# Patient Record
Sex: FEMALE | Race: WHITE | NOT HISPANIC OR LATINO | Employment: FULL TIME | URBAN - METROPOLITAN AREA
[De-identification: names, ages, dates, MRNs, and addresses within clinical notes are randomized per-mention and may not be internally consistent; named-entity substitution may affect disease eponyms.]

---

## 2017-03-21 ENCOUNTER — GENERIC CONVERSION - ENCOUNTER (OUTPATIENT)
Dept: OTHER | Facility: OTHER | Age: 42
End: 2017-03-21

## 2017-04-07 ENCOUNTER — ALLSCRIPTS OFFICE VISIT (OUTPATIENT)
Dept: OTHER | Facility: OTHER | Age: 42
End: 2017-04-07

## 2017-04-07 ENCOUNTER — LAB REQUISITION (OUTPATIENT)
Dept: LAB | Facility: HOSPITAL | Age: 42
End: 2017-04-07
Payer: COMMERCIAL

## 2017-04-07 DIAGNOSIS — I10 ESSENTIAL (PRIMARY) HYPERTENSION: ICD-10-CM

## 2017-04-07 DIAGNOSIS — N30.90 CYSTITIS WITHOUT HEMATURIA: ICD-10-CM

## 2017-04-07 LAB
BACTERIA UR QL AUTO: ABNORMAL /HPF
BILIRUB UR QL STRIP: NEGATIVE
BILIRUB UR QL STRIP: NORMAL
CLARITY UR: ABNORMAL
CLARITY UR: NORMAL
COLOR UR: YELLOW
COLOR UR: YELLOW
GLUCOSE (HISTORICAL): NORMAL
GLUCOSE UR STRIP-MCNC: NEGATIVE MG/DL
HGB UR QL STRIP.AUTO: ABNORMAL
HGB UR QL STRIP.AUTO: NORMAL
HYALINE CASTS #/AREA URNS LPF: ABNORMAL /LPF
KETONES UR STRIP-MCNC: NEGATIVE MG/DL
KETONES UR STRIP-MCNC: NORMAL MG/DL
LEUKOCYTE ESTERASE UR QL STRIP: ABNORMAL
LEUKOCYTE ESTERASE UR QL STRIP: NORMAL
NITRITE UR QL STRIP: NEGATIVE
NITRITE UR QL STRIP: NORMAL
NON-SQ EPI CELLS URNS QL MICRO: ABNORMAL /HPF
PH UR STRIP.AUTO: 7.5 [PH]
PH UR STRIP.AUTO: 8 [PH] (ref 4.5–8)
PROT UR STRIP-MCNC: ABNORMAL MG/DL
PROT UR STRIP-MCNC: NORMAL MG/DL
RBC #/AREA URNS AUTO: ABNORMAL /HPF
SP GR UR STRIP.AUTO: 1.01
SP GR UR STRIP.AUTO: 1.02 (ref 1–1.03)
UROBILINOGEN UR QL STRIP.AUTO: 0.2
UROBILINOGEN UR QL STRIP.AUTO: 1 E.U./DL
WBC #/AREA URNS AUTO: ABNORMAL /HPF

## 2017-04-07 PROCEDURE — 81001 URINALYSIS AUTO W/SCOPE: CPT | Performed by: INTERNAL MEDICINE

## 2017-04-07 PROCEDURE — 87086 URINE CULTURE/COLONY COUNT: CPT | Performed by: INTERNAL MEDICINE

## 2017-04-08 LAB — BACTERIA UR CULT: NORMAL

## 2017-04-10 ENCOUNTER — GENERIC CONVERSION - ENCOUNTER (OUTPATIENT)
Dept: OTHER | Facility: OTHER | Age: 42
End: 2017-04-10

## 2017-04-18 ENCOUNTER — ALLSCRIPTS OFFICE VISIT (OUTPATIENT)
Dept: OTHER | Facility: OTHER | Age: 42
End: 2017-04-18

## 2017-04-20 ENCOUNTER — ALLSCRIPTS OFFICE VISIT (OUTPATIENT)
Dept: OTHER | Facility: OTHER | Age: 42
End: 2017-04-20

## 2017-04-28 ENCOUNTER — ALLSCRIPTS OFFICE VISIT (OUTPATIENT)
Dept: OTHER | Facility: OTHER | Age: 42
End: 2017-04-28

## 2017-05-19 ENCOUNTER — ALLSCRIPTS OFFICE VISIT (OUTPATIENT)
Dept: OTHER | Facility: OTHER | Age: 42
End: 2017-05-19

## 2017-06-23 PROCEDURE — 88305 TISSUE EXAM BY PATHOLOGIST: CPT | Performed by: OBSTETRICS & GYNECOLOGY

## 2017-06-26 ENCOUNTER — LAB REQUISITION (OUTPATIENT)
Dept: LAB | Facility: HOSPITAL | Age: 42
End: 2017-06-26
Payer: COMMERCIAL

## 2017-06-26 DIAGNOSIS — N92.6 IRREGULAR MENSTRUATION: ICD-10-CM

## 2017-06-27 ENCOUNTER — APPOINTMENT (OUTPATIENT)
Dept: LAB | Facility: CLINIC | Age: 42
End: 2017-06-27
Payer: COMMERCIAL

## 2017-06-27 ENCOUNTER — TRANSCRIBE ORDERS (OUTPATIENT)
Dept: LAB | Facility: CLINIC | Age: 42
End: 2017-06-27

## 2017-06-27 DIAGNOSIS — Z01.818 PREOP EXAMINATION: ICD-10-CM

## 2017-06-27 DIAGNOSIS — N92.0 EXCESSIVE OR FREQUENT MENSTRUATION: Primary | ICD-10-CM

## 2017-06-27 DIAGNOSIS — N92.0 EXCESSIVE OR FREQUENT MENSTRUATION: ICD-10-CM

## 2017-06-27 LAB
ABO GROUP BLD: NORMAL
ANION GAP SERPL CALCULATED.3IONS-SCNC: 9 MMOL/L (ref 4–13)
B-HCG SERPL-ACNC: <2 MIU/ML
BASOPHILS # BLD AUTO: 0.03 THOUSANDS/ΜL (ref 0–0.1)
BASOPHILS NFR BLD AUTO: 0 % (ref 0–1)
BLD GP AB SCN SERPL QL: NEGATIVE
BUN SERPL-MCNC: 8 MG/DL (ref 5–25)
CALCIUM SERPL-MCNC: 8.8 MG/DL (ref 8.3–10.1)
CHLORIDE SERPL-SCNC: 104 MMOL/L (ref 100–108)
CO2 SERPL-SCNC: 26 MMOL/L (ref 21–32)
CREAT SERPL-MCNC: 0.94 MG/DL (ref 0.6–1.3)
EOSINOPHIL # BLD AUTO: 0.07 THOUSAND/ΜL (ref 0–0.61)
EOSINOPHIL NFR BLD AUTO: 1 % (ref 0–6)
ERYTHROCYTE [DISTWIDTH] IN BLOOD BY AUTOMATED COUNT: 13.1 % (ref 11.6–15.1)
GFR SERPL CREATININE-BSD FRML MDRD: >60 ML/MIN/1.73SQ M
GLUCOSE P FAST SERPL-MCNC: 88 MG/DL (ref 65–99)
HCT VFR BLD AUTO: 37.5 % (ref 34.8–46.1)
HGB BLD-MCNC: 11.8 G/DL (ref 11.5–15.4)
LYMPHOCYTES # BLD AUTO: 2.5 THOUSANDS/ΜL (ref 0.6–4.47)
LYMPHOCYTES NFR BLD AUTO: 34 % (ref 14–44)
MCH RBC QN AUTO: 27.1 PG (ref 26.8–34.3)
MCHC RBC AUTO-ENTMCNC: 31.5 G/DL (ref 31.4–37.4)
MCV RBC AUTO: 86 FL (ref 82–98)
MONOCYTES # BLD AUTO: 0.37 THOUSAND/ΜL (ref 0.17–1.22)
MONOCYTES NFR BLD AUTO: 5 % (ref 4–12)
NEUTROPHILS # BLD AUTO: 4.38 THOUSANDS/ΜL (ref 1.85–7.62)
NEUTS SEG NFR BLD AUTO: 60 % (ref 43–75)
PLATELET # BLD AUTO: 396 THOUSANDS/UL (ref 149–390)
PMV BLD AUTO: 9.9 FL (ref 8.9–12.7)
POTASSIUM SERPL-SCNC: 4.1 MMOL/L (ref 3.5–5.3)
RBC # BLD AUTO: 4.36 MILLION/UL (ref 3.81–5.12)
RH BLD: POSITIVE
SODIUM SERPL-SCNC: 139 MMOL/L (ref 136–145)
SPECIMEN EXPIRATION DATE: NORMAL
WBC # BLD AUTO: 7.35 THOUSAND/UL (ref 4.31–10.16)

## 2017-06-27 PROCEDURE — 86900 BLOOD TYPING SEROLOGIC ABO: CPT

## 2017-06-27 PROCEDURE — 85025 COMPLETE CBC W/AUTO DIFF WBC: CPT

## 2017-06-27 PROCEDURE — 86850 RBC ANTIBODY SCREEN: CPT

## 2017-06-27 PROCEDURE — 84702 CHORIONIC GONADOTROPIN TEST: CPT

## 2017-06-27 PROCEDURE — 86901 BLOOD TYPING SEROLOGIC RH(D): CPT

## 2017-06-27 PROCEDURE — 36415 COLL VENOUS BLD VENIPUNCTURE: CPT

## 2017-06-27 PROCEDURE — 80048 BASIC METABOLIC PNL TOTAL CA: CPT

## 2017-06-27 RX ORDER — MULTIVITAMIN
1 CAPSULE ORAL DAILY
COMMUNITY

## 2017-06-27 RX ORDER — AMLODIPINE BESYLATE 5 MG/1
5 TABLET ORAL DAILY
COMMUNITY
End: 2018-01-31 | Stop reason: SDUPTHER

## 2017-06-27 RX ORDER — LORATADINE 10 MG/1
10 TABLET ORAL DAILY
COMMUNITY

## 2017-06-30 ENCOUNTER — HOSPITAL ENCOUNTER (OUTPATIENT)
Facility: HOSPITAL | Age: 42
Setting detail: OUTPATIENT SURGERY
Discharge: HOME | End: 2017-07-01
Attending: OBSTETRICS & GYNECOLOGY | Admitting: OBSTETRICS & GYNECOLOGY
Payer: COMMERCIAL

## 2017-06-30 ENCOUNTER — ANESTHESIA (OUTPATIENT)
Dept: PERIOP | Facility: HOSPITAL | Age: 42
End: 2017-06-30
Payer: COMMERCIAL

## 2017-06-30 ENCOUNTER — ANESTHESIA EVENT (OUTPATIENT)
Dept: PERIOP | Facility: HOSPITAL | Age: 42
End: 2017-06-30
Payer: COMMERCIAL

## 2017-06-30 DIAGNOSIS — N92.1 EXCESSIVE AND FREQUENT MENSTRUATION WITH IRREGULAR CYCLE: ICD-10-CM

## 2017-06-30 PROBLEM — Z90.710 S/P LAPAROSCOPIC ASSISTED VAGINAL HYSTERECTOMY (LAVH): Status: ACTIVE | Noted: 2017-06-30

## 2017-06-30 LAB
EXT PREGNANCY TEST URINE: NEGATIVE
GLUCOSE SERPL-MCNC: 130 MG/DL (ref 65–140)
HCT VFR BLD AUTO: 28.3 % (ref 34.8–46.1)
HGB BLD-MCNC: 9.1 G/DL (ref 11.5–15.4)

## 2017-06-30 PROCEDURE — 94760 N-INVAS EAR/PLS OXIMETRY 1: CPT

## 2017-06-30 PROCEDURE — 82948 REAGENT STRIP/BLOOD GLUCOSE: CPT

## 2017-06-30 PROCEDURE — 85014 HEMATOCRIT: CPT | Performed by: OBSTETRICS & GYNECOLOGY

## 2017-06-30 PROCEDURE — 81025 URINE PREGNANCY TEST: CPT | Performed by: OBSTETRICS & GYNECOLOGY

## 2017-06-30 PROCEDURE — 88307 TISSUE EXAM BY PATHOLOGIST: CPT | Performed by: OBSTETRICS & GYNECOLOGY

## 2017-06-30 PROCEDURE — 85018 HEMOGLOBIN: CPT | Performed by: OBSTETRICS & GYNECOLOGY

## 2017-06-30 RX ORDER — MONTELUKAST SODIUM 10 MG/1
10 TABLET ORAL
Status: DISCONTINUED | OUTPATIENT
Start: 2017-06-30 | End: 2017-07-01 | Stop reason: HOSPADM

## 2017-06-30 RX ORDER — CEFAZOLIN SODIUM 1 G/3ML
INJECTION, POWDER, FOR SOLUTION INTRAMUSCULAR; INTRAVENOUS AS NEEDED
Status: DISCONTINUED | OUTPATIENT
Start: 2017-06-30 | End: 2017-06-30 | Stop reason: SURG

## 2017-06-30 RX ORDER — ONDANSETRON 2 MG/ML
4 INJECTION INTRAMUSCULAR; INTRAVENOUS ONCE AS NEEDED
Status: DISCONTINUED | OUTPATIENT
Start: 2017-06-30 | End: 2017-06-30 | Stop reason: HOSPADM

## 2017-06-30 RX ORDER — DOCUSATE SODIUM 100 MG/1
100 CAPSULE, LIQUID FILLED ORAL 2 TIMES DAILY
Status: DISCONTINUED | OUTPATIENT
Start: 2017-06-30 | End: 2017-07-01 | Stop reason: HOSPADM

## 2017-06-30 RX ORDER — ROCURONIUM BROMIDE 10 MG/ML
INJECTION, SOLUTION INTRAVENOUS AS NEEDED
Status: DISCONTINUED | OUTPATIENT
Start: 2017-06-30 | End: 2017-06-30 | Stop reason: SURG

## 2017-06-30 RX ORDER — ALBUTEROL SULFATE 90 UG/1
2 AEROSOL, METERED RESPIRATORY (INHALATION) EVERY 6 HOURS PRN
Status: DISCONTINUED | OUTPATIENT
Start: 2017-06-30 | End: 2017-07-01 | Stop reason: HOSPADM

## 2017-06-30 RX ORDER — FENTANYL CITRATE 50 UG/ML
INJECTION, SOLUTION INTRAMUSCULAR; INTRAVENOUS AS NEEDED
Status: DISCONTINUED | OUTPATIENT
Start: 2017-06-30 | End: 2017-06-30 | Stop reason: SURG

## 2017-06-30 RX ORDER — ALBUTEROL SULFATE 2.5 MG/3ML
2.5 SOLUTION RESPIRATORY (INHALATION) ONCE AS NEEDED
Status: DISCONTINUED | OUTPATIENT
Start: 2017-06-30 | End: 2017-06-30 | Stop reason: HOSPADM

## 2017-06-30 RX ORDER — FENTANYL CITRATE/PF 50 MCG/ML
25 SYRINGE (ML) INJECTION
Status: DISCONTINUED | OUTPATIENT
Start: 2017-06-30 | End: 2017-06-30 | Stop reason: HOSPADM

## 2017-06-30 RX ORDER — ONDANSETRON 2 MG/ML
4 INJECTION INTRAMUSCULAR; INTRAVENOUS EVERY 6 HOURS PRN
Status: DISCONTINUED | OUTPATIENT
Start: 2017-06-30 | End: 2017-07-01 | Stop reason: HOSPADM

## 2017-06-30 RX ORDER — FAMOTIDINE 20 MG/1
20 TABLET, FILM COATED ORAL 2 TIMES DAILY
Status: DISCONTINUED | OUTPATIENT
Start: 2017-06-30 | End: 2017-07-01 | Stop reason: HOSPADM

## 2017-06-30 RX ORDER — DIPHENHYDRAMINE HYDROCHLORIDE 50 MG/ML
25 INJECTION INTRAMUSCULAR; INTRAVENOUS EVERY 6 HOURS PRN
Status: DISCONTINUED | OUTPATIENT
Start: 2017-06-30 | End: 2017-07-01

## 2017-06-30 RX ORDER — LIDOCAINE HYDROCHLORIDE 10 MG/ML
INJECTION, SOLUTION INFILTRATION; PERINEURAL AS NEEDED
Status: DISCONTINUED | OUTPATIENT
Start: 2017-06-30 | End: 2017-06-30 | Stop reason: SURG

## 2017-06-30 RX ORDER — SODIUM CHLORIDE, SODIUM LACTATE, POTASSIUM CHLORIDE, CALCIUM CHLORIDE 600; 310; 30; 20 MG/100ML; MG/100ML; MG/100ML; MG/100ML
125 INJECTION, SOLUTION INTRAVENOUS CONTINUOUS
Status: DISCONTINUED | OUTPATIENT
Start: 2017-06-30 | End: 2017-06-30

## 2017-06-30 RX ORDER — AMLODIPINE BESYLATE 5 MG/1
5 TABLET ORAL DAILY
Status: DISCONTINUED | OUTPATIENT
Start: 2017-07-01 | End: 2017-07-01 | Stop reason: HOSPADM

## 2017-06-30 RX ORDER — SODIUM CHLORIDE 9 MG/ML
INJECTION, SOLUTION INTRAVENOUS CONTINUOUS PRN
Status: DISCONTINUED | OUTPATIENT
Start: 2017-06-30 | End: 2017-06-30 | Stop reason: SURG

## 2017-06-30 RX ORDER — SIMETHICONE 80 MG
80 TABLET,CHEWABLE ORAL EVERY 6 HOURS PRN
Status: DISCONTINUED | OUTPATIENT
Start: 2017-06-30 | End: 2017-07-01 | Stop reason: HOSPADM

## 2017-06-30 RX ORDER — PROPOFOL 10 MG/ML
INJECTION, EMULSION INTRAVENOUS AS NEEDED
Status: DISCONTINUED | OUTPATIENT
Start: 2017-06-30 | End: 2017-06-30 | Stop reason: SURG

## 2017-06-30 RX ORDER — LORATADINE 10 MG/1
10 TABLET ORAL DAILY
Status: DISCONTINUED | OUTPATIENT
Start: 2017-06-30 | End: 2017-07-01 | Stop reason: HOSPADM

## 2017-06-30 RX ORDER — METOCLOPRAMIDE HYDROCHLORIDE 5 MG/ML
5 INJECTION INTRAMUSCULAR; INTRAVENOUS ONCE AS NEEDED
Status: DISCONTINUED | OUTPATIENT
Start: 2017-06-30 | End: 2017-06-30 | Stop reason: HOSPADM

## 2017-06-30 RX ORDER — SODIUM CHLORIDE, SODIUM LACTATE, POTASSIUM CHLORIDE, CALCIUM CHLORIDE 600; 310; 30; 20 MG/100ML; MG/100ML; MG/100ML; MG/100ML
INJECTION, SOLUTION INTRAVENOUS CONTINUOUS PRN
Status: DISCONTINUED | OUTPATIENT
Start: 2017-06-30 | End: 2017-06-30 | Stop reason: SURG

## 2017-06-30 RX ORDER — MIDAZOLAM HYDROCHLORIDE 1 MG/ML
INJECTION INTRAMUSCULAR; INTRAVENOUS AS NEEDED
Status: DISCONTINUED | OUTPATIENT
Start: 2017-06-30 | End: 2017-06-30 | Stop reason: SURG

## 2017-06-30 RX ORDER — KETOROLAC TROMETHAMINE 30 MG/ML
INJECTION, SOLUTION INTRAMUSCULAR; INTRAVENOUS AS NEEDED
Status: DISCONTINUED | OUTPATIENT
Start: 2017-06-30 | End: 2017-06-30 | Stop reason: SURG

## 2017-06-30 RX ORDER — GLYCOPYRROLATE 0.2 MG/ML
INJECTION INTRAMUSCULAR; INTRAVENOUS AS NEEDED
Status: DISCONTINUED | OUTPATIENT
Start: 2017-06-30 | End: 2017-06-30 | Stop reason: SURG

## 2017-06-30 RX ORDER — DEXTROSE AND SODIUM CHLORIDE 5; .45 G/100ML; G/100ML
125 INJECTION, SOLUTION INTRAVENOUS CONTINUOUS
Status: DISCONTINUED | OUTPATIENT
Start: 2017-06-30 | End: 2017-07-01

## 2017-06-30 RX ADMIN — SODIUM CHLORIDE, SODIUM LACTATE, POTASSIUM CHLORIDE, AND CALCIUM CHLORIDE: .6; .31; .03; .02 INJECTION, SOLUTION INTRAVENOUS at 07:17

## 2017-06-30 RX ADMIN — FENTANYL CITRATE 25 MCG: 50 INJECTION INTRAMUSCULAR; INTRAVENOUS at 12:43

## 2017-06-30 RX ADMIN — MIDAZOLAM HYDROCHLORIDE 2 MG: 1 INJECTION, SOLUTION INTRAMUSCULAR; INTRAVENOUS at 07:33

## 2017-06-30 RX ADMIN — GLYCOPYRROLATE 0.4 MG: 0.2 INJECTION INTRAMUSCULAR; INTRAVENOUS at 11:40

## 2017-06-30 RX ADMIN — CEFAZOLIN SODIUM 2000 MG: 2 SOLUTION INTRAVENOUS at 07:47

## 2017-06-30 RX ADMIN — ROCURONIUM BROMIDE 10 MG: 10 INJECTION, SOLUTION INTRAVENOUS at 09:59

## 2017-06-30 RX ADMIN — ROCURONIUM BROMIDE 20 MG: 10 INJECTION, SOLUTION INTRAVENOUS at 08:37

## 2017-06-30 RX ADMIN — ROCURONIUM BROMIDE 10 MG: 10 INJECTION, SOLUTION INTRAVENOUS at 09:33

## 2017-06-30 RX ADMIN — CEFAZOLIN 2000 MG: 1 INJECTION, POWDER, FOR SOLUTION INTRAVENOUS at 11:40

## 2017-06-30 RX ADMIN — HYDROMORPHONE HYDROCHLORIDE 0.5 MG: 1 INJECTION, SOLUTION INTRAMUSCULAR; INTRAVENOUS; SUBCUTANEOUS at 10:32

## 2017-06-30 RX ADMIN — HYDROMORPHONE HYDROCHLORIDE 0.5 MG: 1 INJECTION, SOLUTION INTRAMUSCULAR; INTRAVENOUS; SUBCUTANEOUS at 08:17

## 2017-06-30 RX ADMIN — FENTANYL CITRATE 100 MCG: 50 INJECTION, SOLUTION INTRAMUSCULAR; INTRAVENOUS at 07:41

## 2017-06-30 RX ADMIN — HYDROMORPHONE HYDROCHLORIDE 0.5 MG: 1 INJECTION, SOLUTION INTRAMUSCULAR; INTRAVENOUS; SUBCUTANEOUS at 11:40

## 2017-06-30 RX ADMIN — DEXAMETHASONE SODIUM PHOSPHATE 10 MG: 10 INJECTION INTRAMUSCULAR; INTRAVENOUS at 07:58

## 2017-06-30 RX ADMIN — Medication: at 12:45

## 2017-06-30 RX ADMIN — DEXTROSE AND SODIUM CHLORIDE 125 ML/HR: 5; .45 INJECTION, SOLUTION INTRAVENOUS at 20:09

## 2017-06-30 RX ADMIN — FENTANYL CITRATE 25 MCG: 50 INJECTION INTRAMUSCULAR; INTRAVENOUS at 12:46

## 2017-06-30 RX ADMIN — DEXTROSE AND SODIUM CHLORIDE 125 ML/HR: 5; .45 INJECTION, SOLUTION INTRAVENOUS at 13:01

## 2017-06-30 RX ADMIN — SODIUM CHLORIDE: 0.9 INJECTION, SOLUTION INTRAVENOUS at 07:45

## 2017-06-30 RX ADMIN — NEOSTIGMINE METHYLSULFATE 3 MG: 1 INJECTION, SOLUTION INTRAMUSCULAR; INTRAVENOUS; SUBCUTANEOUS at 11:40

## 2017-06-30 RX ADMIN — MONTELUKAST SODIUM 10 MG: 10 TABLET, FILM COATED ORAL at 21:46

## 2017-06-30 RX ADMIN — PROPOFOL 175 MG: 10 INJECTION, EMULSION INTRAVENOUS at 07:41

## 2017-06-30 RX ADMIN — FAMOTIDINE 20 MG: 20 TABLET ORAL at 17:41

## 2017-06-30 RX ADMIN — ROCURONIUM BROMIDE 10 MG: 10 INJECTION, SOLUTION INTRAVENOUS at 09:15

## 2017-06-30 RX ADMIN — KETOROLAC TROMETHAMINE 30 MG: 30 INJECTION, SOLUTION INTRAMUSCULAR at 11:43

## 2017-06-30 RX ADMIN — ROCURONIUM BROMIDE 50 MG: 10 INJECTION, SOLUTION INTRAVENOUS at 07:41

## 2017-06-30 RX ADMIN — LIDOCAINE HYDROCHLORIDE 50 MG: 10 INJECTION, SOLUTION INFILTRATION; PERINEURAL at 07:41

## 2017-06-30 RX ADMIN — ROCURONIUM BROMIDE 10 MG: 10 INJECTION, SOLUTION INTRAVENOUS at 10:53

## 2017-07-01 VITALS
SYSTOLIC BLOOD PRESSURE: 119 MMHG | HEART RATE: 74 BPM | OXYGEN SATURATION: 100 % | TEMPERATURE: 98.2 F | DIASTOLIC BLOOD PRESSURE: 67 MMHG | WEIGHT: 230.82 LBS | BODY MASS INDEX: 34.19 KG/M2 | HEIGHT: 69 IN | RESPIRATION RATE: 20 BRPM

## 2017-07-01 LAB
ANION GAP SERPL CALCULATED.3IONS-SCNC: 7 MMOL/L (ref 4–13)
BUN SERPL-MCNC: 7 MG/DL (ref 5–25)
CALCIUM SERPL-MCNC: 7.6 MG/DL (ref 8.3–10.1)
CHLORIDE SERPL-SCNC: 106 MMOL/L (ref 100–108)
CO2 SERPL-SCNC: 25 MMOL/L (ref 21–32)
CREAT SERPL-MCNC: 0.68 MG/DL (ref 0.6–1.3)
ERYTHROCYTE [DISTWIDTH] IN BLOOD BY AUTOMATED COUNT: 13.4 % (ref 11.6–15.1)
GFR SERPL CREATININE-BSD FRML MDRD: >60 ML/MIN/1.73SQ M
GLUCOSE SERPL-MCNC: 137 MG/DL (ref 65–140)
HCT VFR BLD AUTO: 26.7 % (ref 34.8–46.1)
HGB BLD-MCNC: 8.7 G/DL (ref 11.5–15.4)
MCH RBC QN AUTO: 28.2 PG (ref 26.8–34.3)
MCHC RBC AUTO-ENTMCNC: 32.6 G/DL (ref 31.4–37.4)
MCV RBC AUTO: 86 FL (ref 82–98)
PLATELET # BLD AUTO: 334 THOUSANDS/UL (ref 149–390)
PMV BLD AUTO: 9.7 FL (ref 8.9–12.7)
POTASSIUM SERPL-SCNC: 4.1 MMOL/L (ref 3.5–5.3)
RBC # BLD AUTO: 3.09 MILLION/UL (ref 3.81–5.12)
SODIUM SERPL-SCNC: 138 MMOL/L (ref 136–145)
WBC # BLD AUTO: 11.34 THOUSAND/UL (ref 4.31–10.16)

## 2017-07-01 PROCEDURE — 80048 BASIC METABOLIC PNL TOTAL CA: CPT | Performed by: OBSTETRICS & GYNECOLOGY

## 2017-07-01 PROCEDURE — 94760 N-INVAS EAR/PLS OXIMETRY 1: CPT

## 2017-07-01 PROCEDURE — 85027 COMPLETE CBC AUTOMATED: CPT | Performed by: OBSTETRICS & GYNECOLOGY

## 2017-07-01 RX ORDER — OXYCODONE HYDROCHLORIDE AND ACETAMINOPHEN 5; 325 MG/1; MG/1
1 TABLET ORAL EVERY 4 HOURS PRN
Status: DISCONTINUED | OUTPATIENT
Start: 2017-07-01 | End: 2017-07-01 | Stop reason: HOSPADM

## 2017-07-01 RX ORDER — DEXTROSE AND SODIUM CHLORIDE 5; .45 G/100ML; G/100ML
50 INJECTION, SOLUTION INTRAVENOUS CONTINUOUS
Status: DISCONTINUED | OUTPATIENT
Start: 2017-07-01 | End: 2017-07-01 | Stop reason: HOSPADM

## 2017-07-01 RX ADMIN — FAMOTIDINE 20 MG: 20 TABLET ORAL at 17:55

## 2017-07-01 RX ADMIN — DOCUSATE SODIUM 100 MG: 100 CAPSULE, LIQUID FILLED ORAL at 09:32

## 2017-07-01 RX ADMIN — DEXTROSE AND SODIUM CHLORIDE 125 ML/HR: 5; .45 INJECTION, SOLUTION INTRAVENOUS at 12:39

## 2017-07-01 RX ADMIN — OXYCODONE HYDROCHLORIDE AND ACETAMINOPHEN 1 TABLET: 5; 325 TABLET ORAL at 17:56

## 2017-07-01 RX ADMIN — DOCUSATE SODIUM 100 MG: 100 CAPSULE, LIQUID FILLED ORAL at 17:55

## 2017-07-01 RX ADMIN — FAMOTIDINE 20 MG: 20 TABLET ORAL at 09:32

## 2017-07-01 RX ADMIN — AMLODIPINE BESYLATE 5 MG: 5 TABLET ORAL at 09:32

## 2017-07-01 RX ADMIN — DEXTROSE AND SODIUM CHLORIDE 125 ML/HR: 5; .45 INJECTION, SOLUTION INTRAVENOUS at 04:19

## 2017-11-15 ENCOUNTER — GENERIC CONVERSION - ENCOUNTER (OUTPATIENT)
Dept: OTHER | Facility: OTHER | Age: 42
End: 2017-11-15

## 2017-12-16 ENCOUNTER — APPOINTMENT (OUTPATIENT)
Dept: LAB | Facility: CLINIC | Age: 42
End: 2017-12-16
Payer: COMMERCIAL

## 2017-12-16 DIAGNOSIS — I10 ESSENTIAL (PRIMARY) HYPERTENSION: ICD-10-CM

## 2017-12-16 LAB
CHOLEST SERPL-MCNC: 183 MG/DL (ref 50–200)
HDLC SERPL-MCNC: 61 MG/DL (ref 40–60)
LDLC SERPL CALC-MCNC: 98 MG/DL (ref 0–100)
TRIGL SERPL-MCNC: 119 MG/DL

## 2017-12-16 PROCEDURE — 36415 COLL VENOUS BLD VENIPUNCTURE: CPT

## 2017-12-16 PROCEDURE — 80061 LIPID PANEL: CPT

## 2017-12-17 ENCOUNTER — GENERIC CONVERSION - ENCOUNTER (OUTPATIENT)
Dept: OTHER | Facility: OTHER | Age: 42
End: 2017-12-17

## 2018-01-11 NOTE — PROGRESS NOTES
Chief Complaint  Patient is here for a BP check      History of Present Illness  HPI: here for bp check    started amlodipine and bp still elevated today  advised to increase dose to 5mg per day and return for bp re-check/nurse visit in 10 days      Active Problems   1  History of Abdominal pain, suprapubic (789 09) (R10 2)  2  Acute sinusitis (461 9) (J01 90)  3  Allergic rhinitis (477 9) (J30 9)  4  Anxiety (300 00) (F41 9)  5  Asthma, mild intermittent (493 90) (J45 20)  6  Atrial premature complex (427 61) (I49 1)  7  Benign essential hypertension (401 1) (I10)  8  Birth control (V25 9) (Z30 9)  9  Bladder infection (595 9) (N30 90)  10  Depression with anxiety (300 4) (F41 8)  11  Dermatitis (692 9) (L30 9)  12  Esophageal reflux (530 81) (K21 9)  13  Herpes simplex infection (054 9) (B00 9)  14  History of allergy (V15 09) (Z88 9)  15  History of urinary frequency (V13 09) (Z87 898)  16  Hyperlipidemia (272 4) (E78 5)  17  Irritable bowel syndrome with diarrhea (564 1) (K58 0)  18  Marital maladjustment involving divorce (V61 03) (Z63 5)  23  Micromastia (611 82) (N64 82)  20  Microscopic hematuria (599 72) (R31 29)  21  Need for prophylactic vaccination and inoculation against influenza (V04 81) (Z23)  22  Obesity (278 00) (E66 9)  23  Palpitations (785 1) (R00 2)  24  Postnasal drip (784 91) (R09 82)  25  Status post breast augmentation (V43 82) (Z98 82)  26  Urinary frequency (788 41) (R35 0)  27  Varicose veins of lower extremities (454 9) (I83 93)  28  Vitamin D deficiency (268 9) (E55 9)    Current Meds  1  AmLODIPine Besylate 2 5 MG Oral Tablet; Take 1 tablet daily; Therapy: 91AVP0284 to (Evaluate:10Wgw4735)  Requested for: 07Apr2017; Last   Rx:07Apr2017 Ordered  2  Montelukast Sodium 10 MG Oral Tablet; TAKE 1 TABLET DAILY; Therapy: 23DPE6942 to (Evaluate:14Mar2016); Last Rx:49Ren3324 Ordered  3   Nitrofurantoin Monohyd Macro 100 MG Oral Capsule; TAKE 1 CAPSULE EVERY 12   HOURS FOR 7 DAYS; Therapy: 46ENG7847 to (Last Rx:07Apr2017)  Requested for: 00Sxx6853 Ordered  4  ProAir  (90 Base) MCG/ACT Inhalation Aerosol Solution; INHALE 1-2 PUFFS   Every 6 hours PRN SOB; Therapy: 59SIU3387 to (Last Rx:14Jun2016)  Requested for: 00LAY3711 Ordered  5  Quasense 0 15-0 03 MG Oral Tablet; Therapy: 36PAS7777 to Recorded  6  ValACYclovir HCl - 1 GM Oral Tablet; TAKE 2 TABLETS TWICE DAILY FOR 1 DAY AT   FIRST SIGN OF ONSET; Therapy: 51Fvg7364 to (Evaluate:73Plj6105)  Requested for: 93PTQ0282; Last   Rx:28Nov2016 Ordered  7  Vitamin D3 2000 UNIT Oral Tablet; Take 1 tablet daily; Therapy: 47COD6092 to Recorded    Allergies   1  Erythromycin Derivatives    Vitals  Signs    Systolic: 013  Diastolic: 94   Heart Rate: 94  Systolic: 343  Diastolic: 90    Assessment   1  Benign essential hypertension (401 1) (I10)    Plan  Benign essential hypertension    · Renew: AmLODIPine Besylate 5 MG Oral Tablet; TAKE 1 TABLET DAILY    Discussion/Summary    1  increase amlodipine to 5mg per day  2  return to office in 10 days for blood pressure re-check        Future Appointments    Date/Time Provider Specialty Site   04/20/2017 09:30 AM VENITA Hermosillo   04/28/2017 09:00 AM Nurse Percy Schedule  [facility] INTERNAL MED     Signatures   Electronically signed by : Jakub Tejeda MA; Jun 6 2017 10:52AM EST                       (Co-author)    Electronically signed by : Jose Tate DO; Jun 6 2017 11:05AM EST                       (Author)

## 2018-01-11 NOTE — PROGRESS NOTES
Chief Complaint  Pt is here for a BP check      History of Present Illness  HPI: bp improved, but pt nervous about having bp checked and getting it better controlled    no other complaints      Active Problems   1  History of Abdominal pain, suprapubic (789 09) (R10 2)  2  Acute sinusitis (461 9) (J01 90)  3  Allergic rhinitis (477 9) (J30 9)  4  Anxiety (300 00) (F41 9)  5  Asthma, mild intermittent (493 90) (J45 20)  6  Atrial premature complex (427 61) (I49 1)  7  Benign essential hypertension (401 1) (I10)  8  Birth control (V25 9) (Z30 9)  9  Bladder infection (595 9) (N30 90)  10  Depression with anxiety (300 4) (F41 8)  11  Dermatitis (692 9) (L30 9)  12  Esophageal reflux (530 81) (K21 9)  13  Herpes simplex infection (054 9) (B00 9)  14  History of allergy (V15 09) (Z88 9)  15  History of urinary frequency (V13 09) (Z87 898)  16  Hyperlipidemia (272 4) (E78 5)  17  Irritable bowel syndrome with diarrhea (564 1) (K58 0)  18  Marital maladjustment involving divorce (V61 03) (Z63 5)  23  Micromastia (611 82) (N64 82)  20  Microscopic hematuria (599 72) (R31 29)  21  Need for prophylactic vaccination and inoculation against influenza (V04 81) (Z23)  22  Obesity (278 00) (E66 9)  23  Palpitations (785 1) (R00 2)  24  Postnasal drip (784 91) (R09 82)  25  Urinary frequency (788 41) (R35 0)  26  Varicose veins of lower extremities (454 9) (I83 93)  27  Vitamin D deficiency (268 9) (E55 9)    Current Meds  1  AmLODIPine Besylate 5 MG Oral Tablet; TAKE 1 TABLET DAILY; Therapy: 67XOZ7513 to (Evaluate:24Kww9605)  Requested for: 18Apr2017; Last   Rx:77Qdz8311 Ordered  2  Montelukast Sodium 10 MG Oral Tablet; TAKE 1 TABLET DAILY; Therapy: 25CMD2912 to (Evaluate:14Mar2016); Last Rx:24Cvb1554 Ordered  3  Nitrofurantoin Monohyd Macro 100 MG Oral Capsule; TAKE 1 CAPSULE EVERY 12   HOURS FOR 7 DAYS; Therapy: 26RVM9018 to (Last Rx:07Apr2017)  Requested for: 07Apr2017 Ordered  4   ProAir  (90 Base) MCG/ACT Inhalation Aerosol Solution; INHALE 1-2 PUFFS   Every 6 hours PRN SOB; Therapy: 64HSK6529 to (Last Rx:14Jun2016)  Requested for: 28VMO8882 Ordered  5  Quasense 0 15-0 03 MG Oral Tablet; Therapy: 42MMF5998 to Recorded  6  ValACYclovir HCl - 1 GM Oral Tablet; TAKE 2 TABLETS TWICE DAILY FOR 1 DAY AT   FIRST SIGN OF ONSET; Therapy: 48Pip0896 to (Evaluate:17Nty7936)  Requested for: 64PTI1080; Last   Rx:28Nov2016 Ordered  7  Vitamin D3 2000 UNIT Oral Tablet; Take 1 tablet daily; Therapy: 49LCS4430 to Recorded    Allergies   1  Erythromycin Derivatives    Vitals  Signs    Systolic: 153  Diastolic: 96   Heart Rate: 96  Systolic: 276  Diastolic: 92    Assessment   1  Benign essential hypertension (401 1) (I10)    Discussion/Summary    1  continue current bp medication and dose  2  re-check in office in about 3-4 weeks        Future Appointments    Date/Time Provider Specialty Site   05/03/2018 09:00 AM VENITA Smith   05/19/2017 09:00 AM Sandra Strong Nurse Schedule  [facility] INTERNAL MED     Signatures   Electronically signed by : Everett Hubbard MA; Jun 6 2017 10:54AM EST                       (Author)    Electronically signed by : Marietta Johnson DO; Jun 6 2017 11:04AM EST                       (Author)

## 2018-01-11 NOTE — RESULT NOTES
Verified Results  (1) URINALYSIS w URINE C/S REFLEX (will reflex a microscopy if leukocytes, occult blood, or nitrites are not within normal limits) 07Apr2017 02:55PM Ciro Oliver     Test Name Result Flag Reference   COLOR Yellow     CLARITY Turbid     PH UA 8 0  4 5-8 0   LEUKOCYTE ESTERASE UA Trace A Negative   NITRITE UA Negative  Negative   PROTEIN UA Trace mg/dl A Negative   GLUCOSE UA Negative mg/dl  Negative   KETONES UA Negative mg/dl  Negative   UROBILINOGEN UA 1 0 E U /dl  0 2, 1 0 E U /dl   BILIRUBIN UA Negative  Negative   BLOOD UA Small A Negative   SPECIFIC GRAVITY UA 1 019  1 003-1 030     (1) URINALYSIS w URINE C/S REFLEX (will reflex a microscopy if leukocytes, occult blood, or nitrites are not within normal limits) 07Apr2017 02:55PM [place] Elinor     Test Name Result Flag Reference   BACTERIA Occasional /hpf  None Seen, Occasional   EPITHELIAL CELLS Moderate /hpf A None Seen, Occasional   HYALINE CASTS None Seen /lpf  None Seen   RBC UA 10-20 /hpf A None Seen   WBC UA 4-10 /hpf A None Seen     (1) URINALYSIS w URINE C/S REFLEX (will reflex a microscopy if leukocytes, occult blood, or nitrites are not within normal limits) 07Apr2017 02:55PM [place] Elinor     Test Name Result Flag Reference   CLINICAL REPORT (Report)     Test:        Urine culture  Specimen Type:   Urine  Specimen Date:   4/7/2017 2:55 PM  Result Date:    4/8/2017 1:27 PM  Result Status:   Final result  Resulting Lab:   [place] [address]            Tel: [phone]      CULTURE                                       ------------------                                   >100,000 cfu/ml Mixed Contaminants X4       *** Resembling mixed skin and/or urogenital manuel ***   Resembling mixed skin and/or urogenital manuel

## 2018-01-11 NOTE — PROGRESS NOTES
Chief Complaint  Patient is here for a BP check      History of Present Illness  HPI: bp improved/controlled today      Active Problems   1  History of Abdominal pain, suprapubic (789 09) (R10 2)  2  Acute sinusitis (461 9) (J01 90)  3  Allergic rhinitis (477 9) (J30 9)  4  Anxiety (300 00) (F41 9)  5  Asthma, mild intermittent (493 90) (J45 20)  6  Atrial premature complex (427 61) (I49 1)  7  Benign essential hypertension (401 1) (I10)  8  Birth control (V25 9) (Z30 9)  9  Bladder infection (595 9) (N30 90)  10  Depression with anxiety (300 4) (F41 8)  11  Dermatitis (692 9) (L30 9)  12  Esophageal reflux (530 81) (K21 9)  13  Herpes simplex infection (054 9) (B00 9)  14  History of allergy (V15 09) (Z88 9)  15  History of urinary frequency (V13 09) (Z87 898)  16  Hyperlipidemia (272 4) (E78 5)  17  Irritable bowel syndrome with diarrhea (564 1) (K58 0)  18  Marital maladjustment involving divorce (V61 03) (Z63 5)  23  Micromastia (611 82) (N64 82)  20  Microscopic hematuria (599 72) (R31 29)  21  Need for prophylactic vaccination and inoculation against influenza (V04 81) (Z23)  22  Obesity (278 00) (E66 9)  23  Palpitations (785 1) (R00 2)  24  Postnasal drip (784 91) (R09 82)  25  Urinary frequency (788 41) (R35 0)  26  Varicose veins of lower extremities (454 9) (I83 93)  27  Vitamin D deficiency (268 9) (E55 9)    Current Meds  1  AmLODIPine Besylate 5 MG Oral Tablet; TAKE 1 TABLET DAILY; Therapy: 96RSP9991 to (Evaluate:54Ngi1038)  Requested for: 18Apr2017; Last   Rx:29Ciu1048 Ordered  2  Montelukast Sodium 10 MG Oral Tablet; TAKE 1 TABLET DAILY; Therapy: 23LVP5553 to (Evaluate:29Whp2530); Last Rx:02Dbe1213 Ordered  3  Nitrofurantoin Monohyd Macro 100 MG Oral Capsule; TAKE 1 CAPSULE EVERY 12   HOURS FOR 7 DAYS; Therapy: 25PRD8580 to (Last Rx:07Apr2017)  Requested for: 07Apr2017 Ordered  4   ProAir  (90 Base) MCG/ACT Inhalation Aerosol Solution; INHALE 1-2 PUFFS   Every 6 hours PRN SOB; Therapy: 59EIV7119 to (Last Rx:14Jun2016)  Requested for: 97AGM4369 Ordered  5  Quasense 0 15-0 03 MG Oral Tablet; Therapy: 32JOG0686 to Recorded  6  ValACYclovir HCl - 1 GM Oral Tablet; TAKE 2 TABLETS TWICE DAILY FOR 1 DAY AT   FIRST SIGN OF ONSET; Therapy: 06Hok9480 to (Evaluate:99Hdl3116)  Requested for: 56EWQ9189; Last   Rx:28Nov2016 Ordered  7  Vitamin D3 2000 UNIT Oral Tablet; Take 1 tablet daily; Therapy: 34PMK1236 to Recorded    Allergies   1  Erythromycin Derivatives    Vitals  Signs    Heart Rate: 96  Systolic: 469  Diastolic: 88    Assessment   1  Benign essential hypertension (401 1) (I10)    Discussion/Summary    1  return in october or as needed  2  continue current blood pressure medication        Future Appointments    Date/Time Provider Specialty Site   05/03/2018 09:00 AM VENITA Monk  Plastic Surgery BODY EVOLUTION PLASTIC RECONS BET   10/25/2017 06:00 PM Adelia Barriga DO Internal Medicine [facility] INTERNAL MED     Signatures   Electronically signed by : Fady Figueroa; Jun 6 2017 10:53AM EST                       (Co-author)    Electronically signed by : Tunrer Maher DO; Jun 6 2017 11:04AM EST                       (Author)

## 2018-01-13 VITALS — HEART RATE: 94 BPM | SYSTOLIC BLOOD PRESSURE: 150 MMHG | DIASTOLIC BLOOD PRESSURE: 94 MMHG

## 2018-01-13 VITALS — SYSTOLIC BLOOD PRESSURE: 122 MMHG | HEART RATE: 96 BPM | DIASTOLIC BLOOD PRESSURE: 88 MMHG

## 2018-01-14 VITALS
HEIGHT: 67 IN | WEIGHT: 221.5 LBS | RESPIRATION RATE: 18 BRPM | TEMPERATURE: 97.4 F | OXYGEN SATURATION: 97 % | BODY MASS INDEX: 34.76 KG/M2 | HEART RATE: 88 BPM | DIASTOLIC BLOOD PRESSURE: 92 MMHG | SYSTOLIC BLOOD PRESSURE: 138 MMHG

## 2018-01-14 VITALS — SYSTOLIC BLOOD PRESSURE: 118 MMHG | HEART RATE: 96 BPM | DIASTOLIC BLOOD PRESSURE: 96 MMHG

## 2018-01-22 VITALS
DIASTOLIC BLOOD PRESSURE: 88 MMHG | SYSTOLIC BLOOD PRESSURE: 134 MMHG | TEMPERATURE: 97.6 F | HEART RATE: 90 BPM | WEIGHT: 220.5 LBS | BODY MASS INDEX: 34.61 KG/M2 | HEIGHT: 67 IN | OXYGEN SATURATION: 98 % | RESPIRATION RATE: 18 BRPM

## 2018-01-23 NOTE — RESULT NOTES
Verified Results  (1) LIPID PANEL FASTING W DIRECT LDL REFLEX [id number] 07:36AM Adelia Barriga    Order Number: [id number]     Test Name Result Flag Reference   CHOLESTEROL 183 mg/dL     LDL CHOLESTEROL CALCULATED 98 mg/dL  0-100   - Patient Instructions: This is a fasting blood test  Water, black tea or black coffee only after 9:00pm the night before test   Drink 2 glasses of water the morning of test       Triglyceride:        Normal <150 mg/dl   Borderline High 150-199 mg/dl   High 200-499 mg/dl   Very High >499 mg/dl      Cholesterol:       Desirable <200 mg/dl    Borderline High 200-239 mg/dl    High >239 mg/dl      HDL Cholesterol:       High>59 mg/dL    Low <41 mg/dL      HDL Cholesterol:       High>59 mg/dL    Low <41 mg/dL      This screening LDL is a calculated result  It does not have the accuracy of the Direct Measured LDL in the monitoring of patients with hyperlipidemia and/or statin therapy  Direct Measure LDL (NLQ583) must be ordered separately in these patients  TRIGLYCERIDES 119 mg/dL  <=150   Specimen collection should occur prior to N-Acetylcysteine or Metamizole administration due to the potential for falsely depressed results  HDL,DIRECT 61 mg/dL H 40-60   Specimen collection should occur prior to Metamizole administration due to the potential for falsley depressed results

## 2018-01-31 DIAGNOSIS — I10 BENIGN ESSENTIAL HTN: Primary | ICD-10-CM

## 2018-01-31 RX ORDER — AMLODIPINE BESYLATE 5 MG/1
5 TABLET ORAL DAILY
Qty: 90 TABLET | Refills: 1 | Status: SHIPPED | OUTPATIENT
Start: 2018-01-31 | End: 2019-01-23 | Stop reason: SDUPTHER

## 2018-04-21 ENCOUNTER — APPOINTMENT (OUTPATIENT)
Dept: RADIOLOGY | Age: 43
End: 2018-04-21
Payer: COMMERCIAL

## 2018-04-21 ENCOUNTER — HOSPITAL ENCOUNTER (OUTPATIENT)
Dept: RADIOLOGY | Facility: HOSPITAL | Age: 43
Discharge: HOME | End: 2018-04-21

## 2018-04-21 ENCOUNTER — OFFICE VISIT (OUTPATIENT)
Dept: URGENT CARE | Age: 43
End: 2018-04-21
Payer: COMMERCIAL

## 2018-04-21 VITALS
SYSTOLIC BLOOD PRESSURE: 124 MMHG | DIASTOLIC BLOOD PRESSURE: 70 MMHG | HEIGHT: 69 IN | WEIGHT: 210 LBS | HEART RATE: 88 BPM | BODY MASS INDEX: 31.1 KG/M2 | OXYGEN SATURATION: 97 % | TEMPERATURE: 97.5 F | RESPIRATION RATE: 20 BRPM

## 2018-04-21 DIAGNOSIS — S93.492A SPRAIN OF ANTERIOR TALOFIBULAR LIGAMENT OF LEFT ANKLE, INITIAL ENCOUNTER: ICD-10-CM

## 2018-04-21 DIAGNOSIS — S93.492A SPRAIN OF ANTERIOR TALOFIBULAR LIGAMENT OF LEFT ANKLE, INITIAL ENCOUNTER: Primary | ICD-10-CM

## 2018-04-21 PROCEDURE — G0382 LEV 3 HOSP TYPE B ED VISIT: HCPCS | Performed by: FAMILY MEDICINE

## 2018-04-21 PROCEDURE — 73630 X-RAY EXAM OF FOOT: CPT

## 2018-04-21 PROCEDURE — 73590 X-RAY EXAM OF LOWER LEG: CPT

## 2018-04-21 PROCEDURE — S9083 URGENT CARE CENTER GLOBAL: HCPCS | Performed by: FAMILY MEDICINE

## 2018-04-21 NOTE — PATIENT INSTRUCTIONS
Rest, ice, elevate your left ankle  Use ace wrap for extra support  Take over the counter ibuprofen or tylenol for pain  Go to your family doctor this week for follow up  If pain continues call [facility] to schedule an appointment with Orthopedics    [phone]    Ankle Sprain   WHAT YOU NEED TO KNOW:   An ankle sprain happens when 1 or more ligaments in your ankle joint stretch or tear  Ligaments are tough tissues that connect bones  Ligaments support your joints and keep your bones in place  DISCHARGE INSTRUCTIONS:   Return to the emergency department if:   · You have severe pain in your ankle  · Your foot or toes are cold or numb  · Your ankle becomes more weak or unstable (wobbly)  · You are unable to put any weight on your ankle or foot  · Your swelling has increased or returned  Contact your healthcare provider if:   · Your pain does not go away, even after treatment  · You have questions or concerns about your condition or care  Medicines: You may need any of the following:  · NSAIDs , such as ibuprofen, help decrease swelling, pain, and fever  This medicine is available with or without a doctor's order  NSAIDs can cause stomach bleeding or kidney problems in certain people  If you take blood thinner medicine, always ask your healthcare provider if NSAIDs are safe for you  Always read the medicine label and follow directions  · Acetaminophen  decreases pain  It is available without a doctor's order  Ask how much to take and how often to take it  Follow directions  Acetaminophen can cause liver damage if not taken correctly  · Prescription pain medicine  may be given  Ask how to take this medicine safely  · Take your medicine as directed  Contact your healthcare provider if you think your medicine is not helping or if you have side effects  Tell him or her if you are allergic to any medicine  Keep a list of the medicines, vitamins, and herbs you take   Include the amounts, and when and why you take them  Bring the list or the pill bottles to follow-up visits  Carry your medicine list with you in case of an emergency  Self care:   · Use support devices,  such as a brace, cast, or splint, may be needed to limit your movement and protect your joint  You may need to use crutches to decrease your pain as you move around  · Go to physical therapy as directed  A physical therapist teaches you exercises to help improve movement and strength, and to decrease pain  · Rest  your ankle so that it can heal  Return to normal activities as directed  · Apply ice on your ankle for 15 to 20 minutes every hour or as directed  Use an ice pack, or put crushed ice in a plastic bag  Cover it with a towel  Ice helps prevent tissue damage and decreases swelling and pain  · Compress  your ankle  Ask if you should wrap an elastic bandage around your injured ligament  An elastic bandage provides support and helps decrease swelling and movement so your joint can heal  Wear as long as directed  · Elevate  your ankle above the level of your heart as often as you can  This will help decrease swelling and pain  Prop your ankle on pillows or blankets to keep it elevated comfortably  Prevent another ankle sprain:   · Let your ankle heal   Find out how long your ligament needs to heal  Do not do any physical activity until your healthcare provider says it is okay  If you start activity too soon, you may develop a more serious injury  · Always warm up and stretch  before you exercise or play sports  · Use the right equipment  Always wear shoes that fit well and are made for the activity that you are doing  You may also need ankle supports, elbow and knee pads, or braces  Follow up with your healthcare provider as directed:  Write down your questions so you remember to ask them during your visits     © 2017 [employer] Information is for End User's use only and may not be sold, redistributed or otherwise used for commercial purposes  All illustrations and images included in CareNotes® are the copyrighted property of A D A M , Inc  or [employer]ino[employer] Alvarado  The above information is an  only  It is not intended as medical advice for individual conditions or treatments  Talk to your doctor, nurse or pharmacist before following any medical regimen to see if it is safe and effective for you

## 2018-04-21 NOTE — PROGRESS NOTES
[address]        NAME: Elizabeth Arellano is a 43 y o  female  : 1975    MRN: [id number]  DATE: 2018  TIME: 7:29 PM    Assessment and Plan   Sprain of anterior talofibular ligament of left ankle, initial encounter [S93 492A]  1  Sprain of anterior talofibular ligament of left ankle, initial encounter  XR foot 3+ vw left    XR tibia fibula 2 vw left    CANCELED: XR ankle 3+ vw left         Patient Instructions       Follow up with PCP in 3-5 days  Proceed to  ER if symptoms worsen  Chief Complaint     Chief Complaint   Patient presents with    Foot Pain     left foot and ankle pain x2 weeks, denies injury         History of Present Illness       Pt has 3 day history of worsening atraumatic L ankle pain  Patient states that there was no trauma or injury  Patient does not remember twisting the ankle and states that she has not increased her exercise or changed her workout plan  No numbness or tingling distally  No swelling  No bruising  Patient able to support her body weight  Patient states she has been walking around on it all day at grocery store and now its worse than it has been  Pain 8/10 throbbing  Review of Systems   Review of Systems   Constitutional: Negative  HENT: Negative  Eyes: Negative  Respiratory: Negative for cough, chest tightness and shortness of breath  Cardiovascular: Negative for chest pain and palpitations  Gastrointestinal: Negative for diarrhea, nausea and vomiting  Endocrine: Negative  Genitourinary: Negative for dysuria  Musculoskeletal: Positive for gait problem  Negative for back pain, myalgias and neck pain  Skin: Negative for pallor and rash  Allergic/Immunologic: Negative  Neurological: Negative for dizziness, syncope and headaches  Hematological: Negative  Psychiatric/Behavioral: Negative            Current Medications       Current Outpatient Prescriptions:     albuterol (PROVENTIL HFA,VENTOLIN HFA) 90 mcg/act inhaler, Inhale 2 puffs every 6 (six) hours as needed for wheezing , Disp: , Rfl:     loratadine (CLARITIN) 10 mg tablet, Take 10 mg by mouth daily, Disp: , Rfl:     montelukast (SINGULAIR) 10 mg tablet, Take 10 mg by mouth daily at bedtime   Pt not dure of dose, advised to bring list am of surgery, Disp: , Rfl:     Multiple Vitamin (MULTIVITAMIN) capsule, Take 1 capsule by mouth daily, Disp: , Rfl:     valACYclovir (VALTREX) 1,000 mg tablet, Take 1,000 mg by mouth 2 (two) times a day as needed (cold sores)  , Disp: , Rfl:     amLODIPine (NORVASC) 5 mg tablet, Take 1 tablet (5 mg total) by mouth daily, Disp: 90 tablet, Rfl: 1    Current Allergies     Allergies as of 04/21/2018 - Reviewed 04/21/2018   Allergen Reaction Noted    Erythromycin  06/28/2016            The following portions of the patient's history were reviewed and updated as appropriate: allergies, current medications, past family history, past medical history, past social history, past surgical history and problem list      Past Medical History:   Diagnosis Date    Anxiety     Asthma     Blood pressure elevated without history of HTN     Last Assessed:6/30/2012    Depression     GERD (gastroesophageal reflux disease)     History of herpes simplex infection     History of hypertension     History of palpitations     Hyperlipidemia     IBS (irritable bowel syndrome)     Microscopic hematuria     Last Assessed:6/10/2014    Obesity     Seasonal allergies     Superficial thrombophlebitis of leg     Vitamin D deficiency        Past Surgical History:   Procedure Laterality Date    COLONOSCOPY      Last Assessed:7/9/2012    DENTAL SURGERY      IN ENLARGE BREAST WITH IMPLANT Bilateral 6/29/2016    Procedure: AUGMENTATION BREAST;  Surgeon: Lady Melany MD;  Location:  MAIN OR;  Service: Plastics    IN LAP,VAG HYST,UTERUS 250GMS/< N/A 6/30/2017    Procedure: LAPAROSCOPIC ASSISTED VAGINAL HYSTERECTOMY AND BILATERAL REMOVAL OF Geronimo Bateman;  Surgeon: Fracisco Martínez MD;  Location: [facility];  Service: Gynecology       Family History   Problem Relation Age of Onset    Heart attack Father      52's    Colon cancer Father     Coronary artery disease Father     Diabetes Father     Heart attack Family     Anxiety disorder Other          Medications have been verified  Objective   /70 (BP Location: Left arm, Patient Position: Sitting, Cuff Size: Large)   Pulse 88   Temp 97 5 °F (36 4 °C) (Temporal)   Resp 20   Ht 5' 9" (1 753 m)   Wt 95 3 kg (210 lb)   LMP 06/16/2016   SpO2 97%   BMI 31 01 kg/m²        Physical Exam     Physical Exam   Constitutional: She is oriented to person, place, and time  She appears well-developed and well-nourished  No distress  HENT:   Head: Normocephalic and atraumatic  Mouth/Throat: Oropharynx is clear and moist    Cardiovascular: Normal rate, regular rhythm, normal heart sounds and intact distal pulses  Pulmonary/Chest: Effort normal and breath sounds normal  No respiratory distress  She has no wheezes  She has no rales  Musculoskeletal: Normal range of motion  She exhibits tenderness (Over L PHU and lateral malleolus)  She exhibits no edema or deformity  Strength sensation circulation intact and symmetrical to LE b/l  No gross swelling or deformity  Full ROM  No bruising noted  Normal gait  Neurological: She is alert and oriented to person, place, and time  Skin: Skin is warm  No rash noted  She is not diaphoretic  Nursing note and vitals reviewed  XR provider read  No acute injury  Ace wraps placed  Patient instructed on RICE and f/u with PCP or ortho  Pt states she understands and agrees

## 2018-05-04 ENCOUNTER — OFFICE VISIT (OUTPATIENT)
Dept: PLASTIC SURGERY | Facility: CLINIC | Age: 43
End: 2018-05-04
Payer: COMMERCIAL

## 2018-05-04 VITALS — BODY MASS INDEX: 31.1 KG/M2 | HEIGHT: 69 IN | WEIGHT: 210 LBS

## 2018-05-04 DIAGNOSIS — Z98.82 S/P BREAST AUGMENTATION: Primary | ICD-10-CM

## 2018-05-04 PROCEDURE — COSCON: Performed by: SURGERY

## 2018-05-04 NOTE — ASSESSMENT & PLAN NOTE
27-year-old female who presents for annual follow-up visit from her breast augmentation 2 years ago  She reports no problems  She has no breast tenderness  She is comfortable with the symmetry  There is no sign of capsular contracture  Her right nipple areolar complex on examination appears to be slightly deviated lateral   Her nipple to notch distance is 20 cm bilaterally  Her inter nipple distance is 26 cm  Her inframammary folds appear symmetric bilaterally  Her size is symmetric  Overall she is pleased with the result  She will return in 1 year for follow-up evaluation

## 2018-05-04 NOTE — PROGRESS NOTES
S/P augmentation mammaplasty  77-year-old female who presents for annual follow-up visit from her breast augmentation 2 years ago  She reports no problems  She has no breast tenderness  She is comfortable with the symmetry  There is no sign of capsular contracture  Her right nipple areolar complex on examination appears to be slightly deviated lateral   Her nipple to notch distance is 20 cm bilaterally  Her inter nipple distance is 26 cm  Her inframammary folds appear symmetric bilaterally  Her size is symmetric  Overall she is pleased with the result  She will return in 1 year for follow-up evaluation

## 2018-05-18 ENCOUNTER — TELEPHONE (OUTPATIENT)
Dept: INTERNAL MEDICINE CLINIC | Facility: CLINIC | Age: 43
End: 2018-05-18

## 2018-05-18 DIAGNOSIS — L03.112 CELLULITIS OF LEFT AXILLA: Primary | ICD-10-CM

## 2018-05-18 PROBLEM — L03.90 CELLULITIS: Status: ACTIVE | Noted: 2018-05-18

## 2018-05-18 RX ORDER — CEPHALEXIN 500 MG/1
500 CAPSULE ORAL EVERY 12 HOURS SCHEDULED
Qty: 14 CAPSULE | Refills: 0 | Status: SHIPPED | OUTPATIENT
Start: 2018-05-18 | End: 2018-05-23 | Stop reason: SDUPTHER

## 2018-05-18 NOTE — TELEPHONE ENCOUNTER
Notified patient, patient will try abx an if not resolved will let us know  She has an appt next week with you so it can be discussed at the visit

## 2018-05-18 NOTE — TELEPHONE ENCOUNTER
Pt  Has an ingrown hair under her armpit that is swollen the size of a golf ball  Can you call in an antbx? She used the drawing salve for 3 days but it just keep getting larger

## 2018-05-23 ENCOUNTER — HOSPITAL ENCOUNTER (OUTPATIENT)
Dept: RADIOLOGY | Facility: HOSPITAL | Age: 43
Discharge: HOME | End: 2018-05-23
Payer: COMMERCIAL

## 2018-05-23 ENCOUNTER — OFFICE VISIT (OUTPATIENT)
Dept: INTERNAL MEDICINE CLINIC | Facility: CLINIC | Age: 43
End: 2018-05-23
Payer: COMMERCIAL

## 2018-05-23 VITALS
OXYGEN SATURATION: 97 % | SYSTOLIC BLOOD PRESSURE: 110 MMHG | HEART RATE: 89 BPM | RESPIRATION RATE: 18 BRPM | DIASTOLIC BLOOD PRESSURE: 82 MMHG | BODY MASS INDEX: 31.64 KG/M2 | TEMPERATURE: 98.1 F | WEIGHT: 213.6 LBS | HEIGHT: 69 IN

## 2018-05-23 DIAGNOSIS — S93.602D FOOT SPRAIN, LEFT, SUBSEQUENT ENCOUNTER: ICD-10-CM

## 2018-05-23 DIAGNOSIS — L02.429 BOIL, AXILLA: Primary | ICD-10-CM

## 2018-05-23 PROCEDURE — 99214 OFFICE O/P EST MOD 30 MIN: CPT | Performed by: INTERNAL MEDICINE

## 2018-05-23 PROCEDURE — 73610 X-RAY EXAM OF ANKLE: CPT

## 2018-05-23 RX ORDER — CEPHALEXIN 500 MG/1
500 CAPSULE ORAL EVERY 12 HOURS SCHEDULED
Qty: 14 CAPSULE | Refills: 0 | Status: SHIPPED | OUTPATIENT
Start: 2018-05-23 | End: 2018-05-30

## 2018-05-23 NOTE — PROGRESS NOTES
Assessment/Plan:       Diagnoses and all orders for this visit:    Boil, axilla  Comments:  draining and feeling better, continue abx/warm compresses & gen surg referral for excision/drainage  Orders:  -     cephalexin (KEFLEX) 500 mg capsule; Take 1 capsule (500 mg total) by mouth every 12 (twelve) hours for 7 days  -     Ambulatory referral to General Surgery; Future    Foot sprain, left, subsequent encounter  Comments:  sx more c/w foot sprain without antededent injury  recommend rest, aircast, analgesics& PT   if not improving further -> MRI foot and podiatry eval  Orders:  -     XR ankle 3+ vw left; Future  -     Ambulatory referral to Physical Therapy; Future          Subjective:      Patient ID: Traci Murdock is a 43 y o  female  HPI    Here for follow up  Reviewed meds with patient  Taking abx for painful boil in left armpit has had for several days since after having /trimming hair in armpit  No fever, chills and pt using salve and area feels better now and with drainage which has helped  Taking cephalexin and no side effects    Hasn't had similar sx before  Also c/o left dorsum of foot pain for 5-6 weeks  No fall/injury and pian come above suddenly during time of doing some house work  Hx of ankle sprain in past but this feels 'different'  Went to [facility] on 4/21 and had x-rays of lower leg and foot which were neg for fx  Was told to wear ace bandage but air cast is helping more  Has been walking at work for exercise and re-started gym exercise this week  Denies any other complaints      Past Medical History:   Diagnosis Date    Anxiety     Asthma     Blood pressure elevated without history of HTN     Last Assessed:6/30/2012    Depression     GERD (gastroesophageal reflux disease)     History of herpes simplex infection     History of hypertension     History of palpitations     Hyperlipidemia     IBS (irritable bowel syndrome)     Microscopic hematuria     Last Assessed:6/10/2014  Obesity     Seasonal allergies     Superficial thrombophlebitis of leg     Vitamin D deficiency      Vitals:    05/23/18 1747   BP: 110/82   Pulse: 89   Resp: 18   Temp: 98 1 °F (36 7 °C)   SpO2: 97%   Weight: 96 9 kg (213 lb 9 6 oz)   Height: 5' 9" (1 753 m)     Body mass index is 31 54 kg/m²  Current Outpatient Prescriptions:     albuterol (PROVENTIL HFA,VENTOLIN HFA) 90 mcg/act inhaler, Inhale 2 puffs every 6 (six) hours as needed for wheezing , Disp: , Rfl:     amLODIPine (NORVASC) 5 mg tablet, Take 1 tablet (5 mg total) by mouth daily, Disp: 90 tablet, Rfl: 1    cephalexin (KEFLEX) 500 mg capsule, Take 1 capsule (500 mg total) by mouth every 12 (twelve) hours for 7 days, Disp: 14 capsule, Rfl: 0    loratadine (CLARITIN) 10 mg tablet, Take 10 mg by mouth daily, Disp: , Rfl:     montelukast (SINGULAIR) 10 mg tablet, Take 10 mg by mouth daily at bedtime  Pt not dure of dose, advised to bring list am of surgery, Disp: , Rfl:     Multiple Vitamin (MULTIVITAMIN) capsule, Take 1 capsule by mouth daily, Disp: , Rfl:     valACYclovir (VALTREX) 1,000 mg tablet, Take 1,000 mg by mouth 2 (two) times a day as needed (cold sores)  , Disp: , Rfl:   Allergies   Allergen Reactions    Erythromycin      Other reaction(s): GI Upset         Review of Systems   Constitutional: Negative for fever  HENT: Negative for congestion  Eyes: Negative for visual disturbance  Respiratory: Negative for shortness of breath  Cardiovascular: Negative for chest pain  Gastrointestinal: Negative for abdominal pain  Genitourinary: Negative for difficulty urinating  Musculoskeletal: Positive for arthralgias and joint swelling  Skin: Positive for rash and wound  Neurological: Negative for headaches  Psychiatric/Behavioral: Negative for dysphoric mood           Objective:      /82   Pulse 89   Temp 98 1 °F (36 7 °C)   Resp 18   Ht 5' 9" (1 753 m)   Wt 96 9 kg (213 lb 9 6 oz)   LMP 06/16/2016   SpO2 97% BMI 31 54 kg/m²          Physical Exam   Constitutional: She is oriented to person, place, and time  She appears well-developed and well-nourished  HENT:   Head: Normocephalic and atraumatic  Mouth/Throat: Oropharynx is clear and moist    Eyes: Conjunctivae are normal  Pupils are equal, round, and reactive to light  Cardiovascular: Normal rate, regular rhythm and normal heart sounds  No murmur heard  Pulmonary/Chest: Effort normal and breath sounds normal  She has no wheezes  She has no rales  Abdominal: Soft  Bowel sounds are normal  There is no tenderness  Musculoskeletal: She exhibits no edema  Left ankle lateral edema below malleolus but non-tender except along dorsum of foot, near 3rd & 4th  Metatarsals  lachman's test b/l ankle neg for joint laxity  Good passive ROM of left foot with pain on foot flexion  Neurological: She is alert and oriented to person, place, and time  Skin: Lesion (3x3 tender, erythematous subq boil in left axilla with serosangionous drainage) noted  Psychiatric: She has a normal mood and affect  Her behavior is normal    Vitals reviewed

## 2018-05-24 ENCOUNTER — TELEPHONE (OUTPATIENT)
Dept: INTERNAL MEDICINE CLINIC | Facility: CLINIC | Age: 43
End: 2018-05-24

## 2018-05-24 DIAGNOSIS — S93.602A FOOT SPRAIN, LEFT, INITIAL ENCOUNTER: Primary | ICD-10-CM

## 2018-05-24 NOTE — TELEPHONE ENCOUNTER
Recommend to see EDGAR HERNANDE[facility] podiatry office for eval first    Ref entered, pls provide phone # to call/schedule and fax ref to their office    thx

## 2018-05-24 NOTE — TELEPHONE ENCOUNTER
----- Message from Keyanna Johnson DO sent at 5/23/2018  6:46 PM EDT -----  Ankle x-ray is back, shows swelling but no fracture or bony injury    pls continue with current tx plan

## 2018-06-26 ENCOUNTER — OFFICE VISIT (OUTPATIENT)
Dept: INTERNAL MEDICINE CLINIC | Facility: CLINIC | Age: 43
End: 2018-06-26
Payer: COMMERCIAL

## 2018-06-26 VITALS
HEART RATE: 88 BPM | BODY MASS INDEX: 31.22 KG/M2 | RESPIRATION RATE: 18 BRPM | SYSTOLIC BLOOD PRESSURE: 120 MMHG | HEIGHT: 69 IN | DIASTOLIC BLOOD PRESSURE: 80 MMHG | TEMPERATURE: 98 F | WEIGHT: 210.8 LBS | OXYGEN SATURATION: 98 %

## 2018-06-26 DIAGNOSIS — N30.90 BLADDER INFECTION: Primary | ICD-10-CM

## 2018-06-26 LAB
BACTERIA UR QL AUTO: ABNORMAL /HPF
BILIRUB UR QL STRIP: NEGATIVE
CLARITY UR: ABNORMAL
COLOR UR: YELLOW
GLUCOSE UR STRIP-MCNC: NEGATIVE MG/DL
HGB UR QL STRIP.AUTO: ABNORMAL
HYALINE CASTS #/AREA URNS LPF: ABNORMAL /LPF
KETONES UR STRIP-MCNC: NEGATIVE MG/DL
LEUKOCYTE ESTERASE UR QL STRIP: ABNORMAL
NITRITE UR QL STRIP: NEGATIVE
NON-SQ EPI CELLS URNS QL MICRO: ABNORMAL /HPF
PH UR STRIP.AUTO: 6 [PH] (ref 4.5–8)
PROT UR STRIP-MCNC: ABNORMAL MG/DL
RBC #/AREA URNS AUTO: ABNORMAL /HPF
SL AMB  POCT GLUCOSE, UA: NORMAL
SL AMB LEUKOCYTE ESTERASE,UA: 500
SL AMB POCT BILIRUBIN,UA: NORMAL
SL AMB POCT BLOOD,UA: 200
SL AMB POCT CLARITY,UA: NORMAL
SL AMB POCT COLOR,UA: YELLOW
SL AMB POCT KETONES,UA: NORMAL
SL AMB POCT NITRITE,UA: NORMAL
SL AMB POCT PH,UA: 6
SL AMB POCT SPECIFIC GRAVITY,UA: 1.01
SL AMB POCT URINE PROTEIN: 30
SL AMB POCT UROBILINOGEN: 0.2
SP GR UR STRIP.AUTO: 1.01 (ref 1–1.03)
UROBILINOGEN UR QL STRIP.AUTO: 0.2 E.U./DL
WBC #/AREA URNS AUTO: ABNORMAL /HPF

## 2018-06-26 PROCEDURE — 81002 URINALYSIS NONAUTO W/O SCOPE: CPT | Performed by: INTERNAL MEDICINE

## 2018-06-26 PROCEDURE — 87077 CULTURE AEROBIC IDENTIFY: CPT | Performed by: INTERNAL MEDICINE

## 2018-06-26 PROCEDURE — 3008F BODY MASS INDEX DOCD: CPT | Performed by: INTERNAL MEDICINE

## 2018-06-26 PROCEDURE — 1036F TOBACCO NON-USER: CPT | Performed by: INTERNAL MEDICINE

## 2018-06-26 PROCEDURE — 81001 URINALYSIS AUTO W/SCOPE: CPT | Performed by: INTERNAL MEDICINE

## 2018-06-26 PROCEDURE — 87086 URINE CULTURE/COLONY COUNT: CPT | Performed by: INTERNAL MEDICINE

## 2018-06-26 PROCEDURE — 87186 SC STD MICRODIL/AGAR DIL: CPT | Performed by: INTERNAL MEDICINE

## 2018-06-26 PROCEDURE — 99214 OFFICE O/P EST MOD 30 MIN: CPT | Performed by: INTERNAL MEDICINE

## 2018-06-26 RX ORDER — NITROFURANTOIN 25; 75 MG/1; MG/1
100 CAPSULE ORAL 2 TIMES DAILY
Qty: 14 CAPSULE | Refills: 0 | Status: SHIPPED | OUTPATIENT
Start: 2018-06-26 | End: 2018-07-03

## 2018-06-26 NOTE — PATIENT INSTRUCTIONS
1  Take antibiotic as prescribed  2  Call if symptoms not improving or if worsening    Urinary Tract Infection in Women   WHAT YOU NEED TO KNOW:   A urinary tract infection (UTI) is caused by bacteria that get inside your urinary tract  Most bacteria that enter your urinary tract come out when you urinate  If the bacteria stay in your urinary tract, you may get an infection  Your urinary tract includes your kidneys, ureters, bladder, and urethra  Urine is made in your kidneys, and it flows from the ureters to the bladder  Urine leaves the bladder through the urethra  A UTI is more common in your lower urinary tract, which includes your bladder and urethra  DISCHARGE INSTRUCTIONS:   Return to the emergency department if:   · You are urinating very little or not at all  · You have a high fever with shaking chills  · You have side or back pain that gets worse  Contact your healthcare provider if:   · You have a fever  · You do not feel better after 2 days of taking antibiotics  · You are vomiting  · You have questions or concerns about your condition or care  Medicines:   · Antibiotics  help fight a bacterial infection  · Medicines  may be given to decrease pain and burning when you urinate  They will also help decrease the feeling that you need to urinate often  These medicines will make your urine orange or red  · Take your medicine as directed  Contact your healthcare provider if you think your medicine is not helping or if you have side effects  Tell him or her if you are allergic to any medicine  Keep a list of the medicines, vitamins, and herbs you take  Include the amounts, and when and why you take them  Bring the list or the pill bottles to follow-up visits  Carry your medicine list with you in case of an emergency  Follow up with your healthcare provider as directed:  Write down your questions so you remember to ask them during your visits     Prevent another UTI:   · Empty your bladder often  Urinate and empty your bladder as soon as you feel the need  Do not hold your urine for long periods of time  · Wipe from front to back after you urinate or have a bowel movement  This will help prevent germs from getting into your urinary tract through your urethra  · Drink liquids as directed  Ask how much liquid to drink each day and which liquids are best for you  You may need to drink more liquids than usual to help flush out the bacteria  Do not drink alcohol, caffeine, or citrus juices  These can irritate your bladder and increase your symptoms  Your healthcare provider may recommend cranberry juice to help prevent a UTI  · Urinate after you have sex  This can help flush out bacteria passed during sex  · Do not douche or use feminine deodorants  These can change the chemical balance in your vagina  · Change sanitary pads or tampons often  This will help prevent germs from getting into your urinary tract  · Do pelvic muscle exercises often  Pelvic muscle exercises may help you start and stop urinating  Strong pelvic muscles may help you empty your bladder easier  Squeeze these muscles tightly for 5 seconds like you are trying to hold back urine  Then relax for 5 seconds  Gradually work up to squeezing for 10 seconds  Do 3 sets of 15 repetitions a day, or as directed  © 2017 [address]  Information is for End User's use only and may not be sold, redistributed or otherwise used for commercial purposes  All illustrations and images included in CareNotes® are the copyrighted property of A [employer] M [employer]  or [employer]  The above information is an  only  It is not intended as medical advice for individual conditions or treatments  Talk to your doctor, nurse or pharmacist before following any medical regimen to see if it is safe and effective for you

## 2018-06-26 NOTE — PROGRESS NOTES
Assessment/Plan:     Diagnoses and all orders for this visit:    Bladder infection  Comments:  confirmed on exam/urine test today in office  abx ordered and culture sent  precautions advised  Orders:  -     POCT urine dip  -     UA w Reflex to Microscopic w Reflex to Culture -Lab Collect  -     nitrofurantoin (MACROBID) 100 mg capsule; Take 1 capsule (100 mg total) by mouth 2 (two) times a day for 7 days          Subjective:      Patient ID: Nikita Melgoza is a 43 y o  female  HPI    Here with c/o dysuria, frequency starting this morning  Denies fever, chills, flank pain or kidney stones in past   Completed abx last month for axillary boil infection and had to see surgeon for excision which has since healed  Ankle is doing better s/p boot and ankle exercises  UA done in office today suggestive of bladder infection  No other complaints  Past Medical History:   Diagnosis Date    Anxiety     Asthma     Blood pressure elevated without history of HTN     Last Assessed:6/30/2012    Depression     GERD (gastroesophageal reflux disease)     History of herpes simplex infection     History of hypertension     History of palpitations     Hyperlipidemia     IBS (irritable bowel syndrome)     Microscopic hematuria     Last Assessed:6/10/2014    Obesity     Seasonal allergies     Superficial thrombophlebitis of leg     Vitamin D deficiency      Vitals:    06/26/18 1653   BP: 120/80   Pulse: 88   Resp: 18   Temp: 98 °F (36 7 °C)   SpO2: 98%   Weight: 95 6 kg (210 lb 12 8 oz)   Height: 5' 9" (1 753 m)     Body mass index is 31 13 kg/m²      Current Outpatient Prescriptions:     albuterol (PROVENTIL HFA,VENTOLIN HFA) 90 mcg/act inhaler, Inhale 2 puffs every 6 (six) hours as needed for wheezing , Disp: , Rfl:     amLODIPine (NORVASC) 5 mg tablet, Take 1 tablet (5 mg total) by mouth daily, Disp: 90 tablet, Rfl: 1    loratadine (CLARITIN) 10 mg tablet, Take 10 mg by mouth daily, Disp: , Rfl:    montelukast (SINGULAIR) 10 mg tablet, Take 10 mg by mouth daily at bedtime  Pt not dure of dose, advised to bring list am of surgery, Disp: , Rfl:     Multiple Vitamin (MULTIVITAMIN) capsule, Take 1 capsule by mouth daily, Disp: , Rfl:     valACYclovir (VALTREX) 1,000 mg tablet, Take 1,000 mg by mouth 2 (two) times a day as needed (cold sores)  , Disp: , Rfl:     nitrofurantoin (MACROBID) 100 mg capsule, Take 1 capsule (100 mg total) by mouth 2 (two) times a day for 7 days, Disp: 14 capsule, Rfl: 0  Allergies   Allergen Reactions    Erythromycin      Other reaction(s): GI Upset         Review of Systems   Constitutional: Negative for chills and fever  HENT: Negative for congestion  Respiratory: Negative for shortness of breath  Cardiovascular: Negative for chest pain  Gastrointestinal: Negative for abdominal pain  Genitourinary: Positive for dysuria and frequency  Negative for flank pain  Musculoskeletal: Negative for arthralgias  Psychiatric/Behavioral: Negative for dysphoric mood  Objective:      /80   Pulse 88   Temp 98 °F (36 7 °C)   Resp 18   Ht 5' 9" (1 753 m)   Wt 95 6 kg (210 lb 12 8 oz)   LMP 06/16/2016   SpO2 98%   BMI 31 13 kg/m²          Physical Exam   Constitutional: She appears well-developed and well-nourished  HENT:   Head: Normocephalic and atraumatic  Eyes: Conjunctivae are normal  Pupils are equal, round, and reactive to light  Cardiovascular: Normal rate, regular rhythm and normal heart sounds  No murmur heard  Pulmonary/Chest: Effort normal and breath sounds normal  She has no wheezes  She has no rales  Abdominal: Soft  Bowel sounds are normal  There is tenderness in the suprapubic area  There is no rigidity, no rebound, no guarding and no CVA tenderness  Musculoskeletal: She exhibits no edema  Neurological: She is alert  Psychiatric: She has a normal mood and affect  Her behavior is normal    Vitals reviewed        Results for orders placed or performed in visit on 06/26/18   POCT urine dip   Result Value Ref Range    LEUKOCYTE ESTERASE,      NITRITE,UA neg     SL AMB POCT UROBILINOGEN 0 2     SL AMB POCT URINE PROTEIN 30      PH,UA 6 0      BLOOD,      SPECIFIC GRAVITY,UA 1 015      KETONES,UA neg      BILIRUBIN,UA neg     GLUCOSE, UA neg      COLOR,UA yellow      CLARITY,UA cloudy

## 2018-06-28 LAB — BACTERIA UR CULT: ABNORMAL

## 2018-06-29 ENCOUNTER — TELEPHONE (OUTPATIENT)
Dept: INTERNAL MEDICINE CLINIC | Facility: CLINIC | Age: 43
End: 2018-06-29

## 2018-06-29 NOTE — TELEPHONE ENCOUNTER
----- Message from Lex Mishra DO sent at 6/28/2018  5:08 PM EDT -----  Urine culture is back and nitrofurantoin abx should adequately clear this infection

## 2018-08-10 DIAGNOSIS — B00.1 RECURRENT COLD SORES: Primary | ICD-10-CM

## 2018-08-10 RX ORDER — VALACYCLOVIR HYDROCHLORIDE 1 G/1
1000 TABLET, FILM COATED ORAL 2 TIMES DAILY PRN
Qty: 20 TABLET | Refills: 1 | Status: SHIPPED | OUTPATIENT
Start: 2018-08-10 | End: 2020-12-28 | Stop reason: SDUPTHER

## 2018-09-20 LAB
FEV1/FVC: 0.83 %
FEV1: 3.03 LITERS
FVC VOL RESPIRATORY: 3.65 LITERS

## 2018-09-20 ASSESSMENT — PULMONARY FUNCTION TESTS
FVC: 3.65
FEV1/FVC: 0.83
FEV1: 3.03

## 2019-01-23 ENCOUNTER — OFFICE VISIT (OUTPATIENT)
Dept: INTERNAL MEDICINE CLINIC | Facility: CLINIC | Age: 44
End: 2019-01-23
Payer: COMMERCIAL

## 2019-01-23 VITALS
TEMPERATURE: 99.1 F | HEIGHT: 69 IN | OXYGEN SATURATION: 96 % | BODY MASS INDEX: 33.12 KG/M2 | WEIGHT: 223.6 LBS | DIASTOLIC BLOOD PRESSURE: 100 MMHG | SYSTOLIC BLOOD PRESSURE: 124 MMHG | RESPIRATION RATE: 18 BRPM | HEART RATE: 89 BPM

## 2019-01-23 DIAGNOSIS — Z13.6 ENCOUNTER FOR LIPID SCREENING FOR CARDIOVASCULAR DISEASE: ICD-10-CM

## 2019-01-23 DIAGNOSIS — E66.9 CLASS 1 OBESITY WITH SERIOUS COMORBIDITY AND BODY MASS INDEX (BMI) OF 33.0 TO 33.9 IN ADULT, UNSPECIFIED OBESITY TYPE: ICD-10-CM

## 2019-01-23 DIAGNOSIS — I10 BENIGN ESSENTIAL HYPERTENSION: Primary | ICD-10-CM

## 2019-01-23 DIAGNOSIS — Z13.220 ENCOUNTER FOR LIPID SCREENING FOR CARDIOVASCULAR DISEASE: ICD-10-CM

## 2019-01-23 PROBLEM — E66.811 CLASS 1 OBESITY WITH SERIOUS COMORBIDITY AND BODY MASS INDEX (BMI) OF 33.0 TO 33.9 IN ADULT: Status: ACTIVE | Noted: 2019-01-23

## 2019-01-23 PROBLEM — E78.5 HYPERLIPIDEMIA: Status: ACTIVE | Noted: 2019-01-23

## 2019-01-23 PROBLEM — N30.90 BLADDER INFECTION: Status: RESOLVED | Noted: 2018-06-26 | Resolved: 2019-01-23

## 2019-01-23 PROBLEM — Z86.79 HISTORY OF HYPERTENSION: Status: ACTIVE | Noted: 2019-01-23

## 2019-01-23 PROCEDURE — 3008F BODY MASS INDEX DOCD: CPT | Performed by: INTERNAL MEDICINE

## 2019-01-23 PROCEDURE — 99213 OFFICE O/P EST LOW 20 MIN: CPT | Performed by: INTERNAL MEDICINE

## 2019-01-23 RX ORDER — AMLODIPINE BESYLATE 5 MG/1
5 TABLET ORAL DAILY
Qty: 90 TABLET | Refills: 0 | Status: SHIPPED | OUTPATIENT
Start: 2019-01-23 | End: 2019-06-26 | Stop reason: SDUPTHER

## 2019-02-06 ENCOUNTER — CLINICAL SUPPORT (OUTPATIENT)
Dept: INTERNAL MEDICINE CLINIC | Facility: CLINIC | Age: 44
End: 2019-02-06

## 2019-02-06 VITALS — SYSTOLIC BLOOD PRESSURE: 130 MMHG | HEART RATE: 92 BPM | DIASTOLIC BLOOD PRESSURE: 100 MMHG

## 2019-02-06 DIAGNOSIS — I10 BENIGN ESSENTIAL HYPERTENSION: Primary | ICD-10-CM

## 2019-02-06 PROCEDURE — RECHECK: Performed by: INTERNAL MEDICINE

## 2019-02-06 RX ORDER — BLOOD PRESSURE TEST KIT
KIT MISCELLANEOUS 2 TIMES WEEKLY
Qty: 1 EACH | Refills: 0 | Status: SHIPPED | OUTPATIENT
Start: 2019-02-07

## 2019-02-06 NOTE — PROGRESS NOTES
Patient here for BP check  Patient taking Amlodipine 5 mg daily  No BP readings available  Instructed as per Dr Alvin Crabtree:    1  Check blood pressure at home  2  Call if readings are 140/90 or higher  3   Return in 4 weeks for re-check of blood pressure

## 2019-02-06 NOTE — PATIENT INSTRUCTIONS
1  Check blood pressure at home  2  Call if readings are 140/90 or higher  3  Return in 4 weeks for re-check of blood pressure    DASH Eating Plan   AMBULATORY CARE:   The DASH (Dietary Approaches to Stop Hypertension) Eating Plan  is designed to help prevent or lower high blood pressure  It can also help to lower LDL (bad) cholesterol and decrease your risk for heart disease  The plan is low in sodium, sugar, unhealthy fats, and total fat  It is high in potassium, calcium, magnesium, and fiber  These nutrients are added when you eat more fruits, vegetables, and whole grains  Your sodium limit each day: Your dietitian will tell you how much sodium is safe for you to have each day  People with high blood pressure should have no more than 1,500 to 2,300 mg of sodium in a day  A teaspoon (tsp) of salt has 2,300 mg of sodium  This may seem like a difficult goal, but small changes to the foods you eat can make a big difference  Your healthcare provider or dietitian can help you create a meal plan that follows your sodium limit  How to limit sodium:   · Read food labels  Food labels can help you choose foods that are low in sodium  The amount of sodium is listed in milligrams (mg)  The % Daily Value (DV) column tells you how much of your daily needs are met by 1 serving of the food for each nutrient listed  Choose foods that have less than 5% of the DV of sodium  These foods are considered low in sodium  Foods that have 20% or more of the DV of sodium are considered high in sodium  Avoid foods that have more than 300 mg of sodium in each serving  Choose foods that say low-sodium, reduced-sodium, or no salt added on the food label  · Avoid salt  Do not salt food at the table, and add very little salt to foods during cooking  Use herbs and spices, such as onions, garlic, and salt-free seasonings to add flavor to foods  Try lemon or lime juice or vinegar to give foods a tart flavor   Use hot peppers or a small amount of hot pepper sauce to add a spicy flavor to foods  · Ask about salt substitutes  Ask your healthcare provider if you may use salt substitutes  Some salt substitutes have ingredients that can be harmful if you have certain health conditions  · Choose foods carefully at restaurants  Meals from restaurants, especially fast food restaurants, are often high in sodium  Some restaurants have nutrition information that tells you the amount of sodium in their foods  Ask to have your food prepared with less, or no salt  What you need to know about fats:   · Include healthy fats  Examples are unsaturated fats and omega-3 fatty acids  Unsaturated fats are found in soybean, canola, olive, or sunflower oil, and liquid and soft tub margarines  Omega-3 fatty acids are found in fatty fish, such as salmon, tuna, mackerel, and sardines  It is also found in flaxseed oil and ground flaxseed  · Avoid unhealthy fats  Do not eat unhealthy fats, such as saturated fats and trans fats  Saturated fats are found in foods that contain fat from animals  Examples are fatty meats, whole milk, butter, cream, and other dairy foods  It is also found in shortening, stick margarine, palm oil, and coconut oil  Trans fats are found in fried foods, crackers, chips, and baked goods made with margarine or shortening  Foods to include: With the DASH eating plan, you need to eat a certain number of servings from each food group  This will help you get enough of certain nutrients and limit others  The amount of servings you should eat depends on how many calories you need  Your dietitian can tell you how many calories you need  The number of servings listed next to the food groups below are for people who need about 2,000 calories each day    · Grains:  6 to 8 servings (3 of these servings should be whole-grain foods)    ¨ 1 slice of whole-grain bread     ¨ 1 ounce of dry cereal    ¨ ½ cup of cooked cereal, pasta, or [employer] rice    · Vegetables and fruits:  4 to 5 servings of fruits and 4 to 5 servings of vegetables    ¨ 1 medium fruit    ¨ ½ cup of frozen, canned (no added salt), or chopped fresh vegetables     ¨ ½ cup of fresh, frozen, dried, or canned fruit (canned in light syrup or fruit juice)    ¨ ½ cup of vegetable or fruit juice    · Dairy:  2 to 3 servings    ¨ 1 cup of nonfat (skim) or 1% milk    ¨ 1½ ounces of fat-free or low-fat cheese    ¨ 6 ounces of nonfat or low-fat yogurt    · Lean meat, poultry, and fish:  6 ounces or less    [employer] (chicken, turkey) with no skin    ¨ Fish (especially fatty fish, such as salmon, fresh tuna, or mackerel)    ¨ Lean beef and pork (loin, round, extra lean hamburger)    ¨ Egg whites and egg substitutes    · Nuts, seeds, and legumes:  4 to 5 servings each week    ¨ ½ cup of cooked beans and peas    ¨ 1½ ounces of unsalted nuts    ¨ 2 tablespoons of peanut butter or seeds    · Sweets and added sugars:  5 or less each week    ¨ 1 tablespoon of sugar, jelly, or jam    ¨ ½ cup of sorbet or gelatin    ¨ 1 cup of lemonade    · Fats:  2 to 3 servings each week    ¨ 1 teaspoon of soft margarine or vegetable oil    ¨ 1 tablespoon of mayonnaise    ¨ 2 tablespoons of salad dressing  Foods to avoid:   · Grains:      [employer], such as doughnuts, pastries, cookies, and biscuits (high in fat and sugar)    ¨ Mixes for cornbread and biscuits, packaged foods, such as bread stuffing, rice and pasta mixes, macaroni and cheese, and instant cereals (high in sodium)    · Fruits and vegetables:      ¨ Regular, canned vegetables (high in sodium)    ¨ Sauerkraut, pickled vegetables, and other foods prepared in brine (high in sodium)    ¨ Fried vegetables or vegetables in butter or high-fat sauces    ¨ Fruit in cream or butter sauce (high in fat)    · Dairy:      ¨ Whole milk, 2% milk, and cream (high in fat)    ¨ Regular cheese and processed cheese (high in fat and sodium)    · Meats and protein foods: ¨ Smoked or cured meat, such as corned beef, vazquez, ham, hot dogs, and sausage (high in fat and sodium)    ¨ Canned beans and canned meats or spreads, such as potted meats, sardines, anchovies, and imitation seafood (high in sodium)    ¨ Deli or lunch meats, such as bologna, ham, turkey, and roast beef (high in sodium)    ¨ High-fat meat (T-bone steak, regular hamburger, and ribs)    ¨ Whole eggs and egg yolks (high in fat)    · Other:      ¨ Seasonings made with salt, such as garlic salt, celery salt, onion salt, seasoned salt, meat tenderizers, and monosodium glutamate (MSG)    ¨ Miso soup and canned or dried soup mixes (high in sodium)    ¨ Regular soy sauce, barbecue sauce, teriyaki sauce, steak sauce, Worcestershire sauce, and most flavored vinegars (high in sodium)    ¨ Regular condiments, such as mustard, ketchup, and salad dressings (high in sodium)    ¨ Gravy and sauces, such as [place] or cheese sauces (high in sodium and fat)    ¨ Drinks high in sugar, such as soda or fruit drinks    [employer] foods, such as salted chips, popcorn, pretzels, pork rinds, salted crackers, and salted nuts    ¨ Frozen foods, such as dinners, entrees, vegetables with sauces, and breaded meats (high in sodium)  Other guidelines to follow:   · Maintain a healthy weight  Your risk for heart disease is higher if you are overweight  Your healthcare provider may suggest that you lose weight if you are overweight  You can lose weight by eating fewer calories and foods that have added sugars and fat  The DASH meal plan can help you do this  Decrease calories by eating smaller portions at each meal and fewer snacks  Ask your healthcare provider for more information about how to lose weight  · Exercise regularly  Regular exercise can help you reach or maintain a healthy weight  Regular exercise can also help decrease your blood pressure and improve your cholesterol levels   Get 30 minutes or more of moderate exercise each day of the week  To lose weight, get at least 60 minutes of exercise  Talk to your healthcare provider about the best exercise program for you  · Limit alcohol  Women should limit alcohol to 1 drink a day  Men should limit alcohol to 2 drinks a day  A drink of alcohol is 12 ounces of beer, 5 ounces of wine, or 1½ ounces of liquor  © 2017 [facility] Information is for End User's use only and may not be sold, redistributed or otherwise used for commercial purposes  All illustrations and images included in CareNotes® are the copyrighted property of [employer]  or [employer]  The above information is an  only  It is not intended as medical advice for individual conditions or treatments  Talk to your doctor, nurse or pharmacist before following any medical regimen to see if it is safe and effective for you

## 2019-03-06 ENCOUNTER — CLINICAL SUPPORT (OUTPATIENT)
Dept: INTERNAL MEDICINE CLINIC | Facility: CLINIC | Age: 44
End: 2019-03-06

## 2019-03-06 VITALS — SYSTOLIC BLOOD PRESSURE: 126 MMHG | DIASTOLIC BLOOD PRESSURE: 88 MMHG | HEART RATE: 83 BPM

## 2019-03-06 DIAGNOSIS — I10 BENIGN ESSENTIAL HYPERTENSION: Primary | ICD-10-CM

## 2019-03-06 PROCEDURE — RECHECK: Performed by: INTERNAL MEDICINE

## 2019-03-06 NOTE — PROGRESS NOTES
Patient here for BP check  Patient taking Amlodipine 5 mg daily  Instructed as per Dr Steve Cruz:    1  Continue amlodipine 5mg per day  2   Return in June for office visit

## 2019-03-06 NOTE — PATIENT INSTRUCTIONS
1  Continue amlodipine 5mg per day  2  Return in June for office visit  Hypertension, Ambulatory Care   GENERAL INFORMATION:   Hypertension  is high blood pressure (BP)  Your BP is the force of your blood moving against the walls of your arteries  Hypertension is a BP of 140/90 or higher  Hypertension causes your BP to get so high that your heart has to work much harder than normal  This can cause damage to your heart  Common symptoms include the following:   · Headache     · Blurred vision     · Chest pain     · Dizziness or weakness     · Trouble breathing    · Nosebleeds  Seek immediate care for the following symptoms:   · Severe headache or vision loss    · Weakness in an arm or leg    · Confusion or difficulty speaking    · Discomfort in your chest that feels like squeezing, pressure, fullness, or pain    · Suddenly feeling lightheaded or trouble breathing    · Pain or discomfort in your back, neck, jaw, stomach, or arm  Treatment for hypertension  may include medicine to lower your BP  You may also need to make lifestyle changes  Take your medicine exactly as directed  Manage hypertension:   · Take your BP at home  Sit and rest for 5 minutes before you take your BP  Extend your arm and support it on a flat surface  Your arm should be at the same level as your heart  Follow the directions that came with your BP monitor  If possible, take at least 2 BP readings each time  Take your BP at least twice a day at the same times each day, such as morning and evening  Keep a log of your BP readings and bring it to your follow-up visits  · Eat less sodium (salt)  Do not add sodium to your food  Limit foods that are high in sodium, such as canned foods, potato chips, and cold cuts  Your healthcare provider may suggest that you follow the [address]  The plan is low in sodium, unhealthy fats, and total fat  It is high in potassium, calcium, and fiber  · Exercise regularly    Exercise at least 30 minutes per day, on most days of the week  This will help decrease your BP  Ask your healthcare provider about the best exercise plan for you  · Limit alcohol  Women should limit alcohol to 1 drink a day  Men should limit alcohol to 2 drinks a day  A drink of alcohol is 12 ounces of beer, 5 ounces of wine, or 1½ ounces of liquor  · Do not smoke  If you smoke, it is never too late to quit  Smoking can increase your BP  Smoking also worsens other health conditions you may have that can increase your risk for hypertension  Ask your healthcare provider for information if you need help quitting  Follow up with your healthcare provider as directed: You will need to return to have your BP checked and to have other lab tests done  Write down your questions so you remember to ask them during your visits  CARE AGREEMENT:   You have the right to help plan your care  Learn about your health condition and how it may be treated  Discuss treatment options with your caregivers to decide what care you want to receive  You always have the right to refuse treatment  The above information is an  only  It is not intended as medical advice for individual conditions or treatments  Talk to your doctor, nurse or pharmacist before following any medical regimen to see if it is safe and effective for you  © 2014 [address] is for End User's use only and may not be sold, redistributed or otherwise used for commercial purposes  All illustrations and images included in CareNotes® are the copyrighted property of A D A M [employer]  or [employer]

## 2019-06-26 DIAGNOSIS — I10 BENIGN ESSENTIAL HYPERTENSION: ICD-10-CM

## 2019-06-26 RX ORDER — AMLODIPINE BESYLATE 5 MG/1
5 TABLET ORAL DAILY
Qty: 90 TABLET | Refills: 1 | Status: SHIPPED | OUTPATIENT
Start: 2019-06-26 | End: 2020-01-08 | Stop reason: SDUPTHER

## 2019-07-10 ENCOUNTER — OFFICE VISIT (OUTPATIENT)
Dept: INTERNAL MEDICINE CLINIC | Facility: CLINIC | Age: 44
End: 2019-07-10
Payer: COMMERCIAL

## 2019-07-10 VITALS
HEART RATE: 93 BPM | WEIGHT: 224.6 LBS | SYSTOLIC BLOOD PRESSURE: 120 MMHG | RESPIRATION RATE: 18 BRPM | BODY MASS INDEX: 33.27 KG/M2 | HEIGHT: 69 IN | TEMPERATURE: 99.3 F | OXYGEN SATURATION: 98 % | DIASTOLIC BLOOD PRESSURE: 74 MMHG

## 2019-07-10 DIAGNOSIS — K58.0 IRRITABLE BOWEL SYNDROME WITH DIARRHEA: ICD-10-CM

## 2019-07-10 DIAGNOSIS — E66.9 CLASS 1 OBESITY WITH SERIOUS COMORBIDITY AND BODY MASS INDEX (BMI) OF 33.0 TO 33.9 IN ADULT, UNSPECIFIED OBESITY TYPE: ICD-10-CM

## 2019-07-10 DIAGNOSIS — I10 BENIGN ESSENTIAL HYPERTENSION: Primary | ICD-10-CM

## 2019-07-10 PROCEDURE — 3008F BODY MASS INDEX DOCD: CPT | Performed by: NURSE PRACTITIONER

## 2019-07-10 PROCEDURE — 99214 OFFICE O/P EST MOD 30 MIN: CPT | Performed by: NURSE PRACTITIONER

## 2019-07-10 PROCEDURE — 3074F SYST BP LT 130 MM HG: CPT | Performed by: NURSE PRACTITIONER

## 2019-07-10 NOTE — ASSESSMENT & PLAN NOTE
Continue amlodipine   Low sodium diet  Encouraged weight loss, small diet changes, and walking 30 minutes 3 times weekly

## 2019-07-10 NOTE — PROGRESS NOTES
Assessment/Plan:    Irritable bowel syndrome with diarrhea  Referral to colorectal, ?repeat colonoscopy   imodium as needed   Offered bentyl but she would like to hold off on further medication         Benign essential hypertension  Continue amlodipine   Low sodium diet  Encouraged weight loss, small diet changes, and walking 30 minutes 3 times weekly       Class 1 obesity with serious comorbidity and body mass index (BMI) of 33 0 to 33 9 in adult  BMI Counseling: Body mass index is 33 17 kg/m²  Discussed the patient's BMI with her  The BMI is above average  BMI counseling and education was provided to the patient  Nutrition recommendations include reducing portion sizes, decreasing overall calorie intake, 3-5 servings of fruits/vegetables daily, reducing fast food intake, consuming healthier snacks, decreasing soda and/or juice intake, moderation in carbohydrate intake and increasing intake of lean protein  Exercise recommendations include exercising 3-5 times per week  Diagnoses and all orders for this visit:    Benign essential hypertension  -     Comprehensive metabolic panel; Future    Class 1 obesity with serious comorbidity and body mass index (BMI) of 33 0 to 33 9 in adult, unspecified obesity type  -     Lipid Panel with Direct LDL reflex; Future  -     TSH, 3rd generation with Free T4 reflex; Future    Irritable bowel syndrome with diarrhea  -     Ambulatory referral to Colorectal Surgery; Future  -     CBC and differential; Future          Subjective:      Patient ID: Vanita Mcguire is a 37 y o  female      Here for follow up of hypertension  Malia Gilberto is new to me today, last seen by Dr Courtney Mitchell in [place] this year   Started back on amlodpine at that time   Blood pressure better today  Compliant with medication   Has not made any dietary changes , recently bought healthy cookbook - plans to make small changes  No formal exercise     She has a 20 year history of intermittent diarrhea/abdominal cramping not related to specific food  She's had 2 colonoscopies in the past, most recent was 11 years ago which were normal   She was advised to take immodium as needed  She feels she has IBS with diarrhea   There has been no change in condition but requesting follow up, possible repeat colonoscopy     Single mom  Daughter age 12              The following portions of the patient's history were reviewed and updated as appropriate: allergies, current medications, past family history, past medical history, past social history, past surgical history and problem list     Review of Systems   Constitutional: Negative for activity change, appetite change, fatigue and unexpected weight change  Eyes: Negative for visual disturbance  Respiratory: Negative for cough, chest tightness and shortness of breath  Cardiovascular: Negative for chest pain, palpitations and leg swelling  Gastrointestinal: Negative for blood in stool, nausea, rectal pain and vomiting  Intermittent abdominal cramping with diarrhea    Musculoskeletal: Negative for myalgias  Neurological: Negative for dizziness, weakness, light-headedness and headaches  Objective:      /74   Pulse 93   Temp 99 3 °F (37 4 °C) (Oral)   Resp 18   Ht 5' 9" (1 753 m)   Wt 102 kg (224 lb 9 6 oz)   LMP 06/16/2016   SpO2 98%   BMI 33 17 kg/m²          Physical Exam   Constitutional: She appears well-developed and well-nourished  HENT:   Mouth/Throat: Oropharynx is clear and moist    Eyes: Pupils are equal, round, and reactive to light  Neck: No thyromegaly present  Cardiovascular: Normal rate, regular rhythm, normal heart sounds and intact distal pulses  Pulmonary/Chest: Effort normal and breath sounds normal    Abdominal: Soft  Bowel sounds are normal  She exhibits no distension  There is no tenderness  Musculoskeletal: She exhibits no edema  Neurological: She is alert  Skin: Skin is warm and dry     Psychiatric: She has a normal mood and affect  Her behavior is normal    Vitals reviewed

## 2019-07-10 NOTE — ASSESSMENT & PLAN NOTE
Referral to colorectal, ?repeat colonoscopy   imodium as needed   Offered bentyl but she would like to hold off on further medication

## 2019-07-10 NOTE — ASSESSMENT & PLAN NOTE
BMI Counseling: Body mass index is 33 17 kg/m²  Discussed the patient's BMI with her  The BMI is above average  BMI counseling and education was provided to the patient  Nutrition recommendations include reducing portion sizes, decreasing overall calorie intake, 3-5 servings of fruits/vegetables daily, reducing fast food intake, consuming healthier snacks, decreasing soda and/or juice intake, moderation in carbohydrate intake and increasing intake of lean protein  Exercise recommendations include exercising 3-5 times per week

## 2019-08-12 PROBLEM — K59.1 FUNCTIONAL DIARRHEA: Status: ACTIVE | Noted: 2019-08-12

## 2019-08-23 ENCOUNTER — APPOINTMENT (OUTPATIENT)
Dept: LAB | Facility: CLINIC | Age: 44
End: 2019-08-23
Payer: COMMERCIAL

## 2019-08-23 ENCOUNTER — TRANSCRIBE ORDERS (OUTPATIENT)
Dept: ADMINISTRATIVE | Age: 44
End: 2019-08-23

## 2019-08-23 DIAGNOSIS — R19.7 DIARRHEA, UNSPECIFIED TYPE: ICD-10-CM

## 2019-08-23 DIAGNOSIS — K58.0 IRRITABLE BOWEL SYNDROME WITH DIARRHEA: ICD-10-CM

## 2019-08-23 DIAGNOSIS — I10 BENIGN ESSENTIAL HYPERTENSION: ICD-10-CM

## 2019-08-23 DIAGNOSIS — E66.9 CLASS 1 OBESITY WITH SERIOUS COMORBIDITY AND BODY MASS INDEX (BMI) OF 33.0 TO 33.9 IN ADULT, UNSPECIFIED OBESITY TYPE: ICD-10-CM

## 2019-08-23 LAB
ALBUMIN SERPL BCP-MCNC: 3.5 G/DL (ref 3.5–5)
ALP SERPL-CCNC: 73 U/L (ref 46–116)
ALT SERPL W P-5'-P-CCNC: 20 U/L (ref 12–78)
ANION GAP SERPL CALCULATED.3IONS-SCNC: 8 MMOL/L (ref 4–13)
AST SERPL W P-5'-P-CCNC: 13 U/L (ref 5–45)
BASOPHILS # BLD AUTO: 0.04 THOUSANDS/ΜL (ref 0–0.1)
BASOPHILS NFR BLD AUTO: 1 % (ref 0–1)
BILIRUB SERPL-MCNC: 0.4 MG/DL (ref 0.2–1)
BUN SERPL-MCNC: 11 MG/DL (ref 5–25)
CALCIUM SERPL-MCNC: 8.4 MG/DL (ref 8.3–10.1)
CHLORIDE SERPL-SCNC: 105 MMOL/L (ref 100–108)
CHOLEST SERPL-MCNC: 192 MG/DL (ref 50–200)
CO2 SERPL-SCNC: 27 MMOL/L (ref 21–32)
CREAT SERPL-MCNC: 0.87 MG/DL (ref 0.6–1.3)
EOSINOPHIL # BLD AUTO: 0.06 THOUSAND/ΜL (ref 0–0.61)
EOSINOPHIL NFR BLD AUTO: 1 % (ref 0–6)
ERYTHROCYTE [DISTWIDTH] IN BLOOD BY AUTOMATED COUNT: 12.4 % (ref 11.6–15.1)
GFR SERPL CREATININE-BSD FRML MDRD: 82 ML/MIN/1.73SQ M
GLUCOSE 1.5H P LAC PO SERPL-MCNC: 104 MG/DL (ref 70–183)
GLUCOSE 15M P LAC PO SERPL-MCNC: 104 MG/DL (ref 40–500)
GLUCOSE 1H P LAC PO UR-MCNC: 113 MG/DL (ref 70–183)
GLUCOSE 2H P LAC PO SERPL-MCNC: 84 MG/DL (ref 70–183)
GLUCOSE 30M P LAC PO SERPL-MCNC: 126 MG/DL (ref 70–183)
GLUCOSE 3H P LAC PO SERPL-MCNC: 78 MG/DL (ref 70–183)
GLUCOSE P FAST SERPL-MCNC: 89 MG/DL (ref 65–99)
GLUCOSE P FAST SERPL-MCNC: 90 MG/DL (ref 65–99)
HCT VFR BLD AUTO: 43.2 % (ref 34.8–46.1)
HDLC SERPL-MCNC: 45 MG/DL (ref 40–60)
HGB BLD-MCNC: 14 G/DL (ref 11.5–15.4)
IMM GRANULOCYTES # BLD AUTO: 0.01 THOUSAND/UL (ref 0–0.2)
IMM GRANULOCYTES NFR BLD AUTO: 0 % (ref 0–2)
LDLC SERPL CALC-MCNC: 129 MG/DL (ref 0–100)
LYMPHOCYTES # BLD AUTO: 2.48 THOUSANDS/ΜL (ref 0.6–4.47)
LYMPHOCYTES NFR BLD AUTO: 35 % (ref 14–44)
MCH RBC QN AUTO: 29.4 PG (ref 26.8–34.3)
MCHC RBC AUTO-ENTMCNC: 32.4 G/DL (ref 31.4–37.4)
MCV RBC AUTO: 91 FL (ref 82–98)
MONOCYTES # BLD AUTO: 0.45 THOUSAND/ΜL (ref 0.17–1.22)
MONOCYTES NFR BLD AUTO: 6 % (ref 4–12)
NEUTROPHILS # BLD AUTO: 3.96 THOUSANDS/ΜL (ref 1.85–7.62)
NEUTS SEG NFR BLD AUTO: 57 % (ref 43–75)
NRBC BLD AUTO-RTO: 0 /100 WBCS
PLATELET # BLD AUTO: 300 THOUSANDS/UL (ref 149–390)
PMV BLD AUTO: 9.5 FL (ref 8.9–12.7)
POTASSIUM SERPL-SCNC: 4.1 MMOL/L (ref 3.5–5.3)
PROT SERPL-MCNC: 7.4 G/DL (ref 6.4–8.2)
RBC # BLD AUTO: 4.77 MILLION/UL (ref 3.81–5.12)
SODIUM SERPL-SCNC: 140 MMOL/L (ref 136–145)
TRIGL SERPL-MCNC: 92 MG/DL
TSH SERPL DL<=0.05 MIU/L-ACNC: 2.94 UIU/ML (ref 0.36–3.74)
WBC # BLD AUTO: 7 THOUSAND/UL (ref 4.31–10.16)

## 2019-08-23 PROCEDURE — 86255 FLUORESCENT ANTIBODY SCREEN: CPT

## 2019-08-23 PROCEDURE — 82952 GTT-ADDED SAMPLES: CPT

## 2019-08-23 PROCEDURE — 36415 COLL VENOUS BLD VENIPUNCTURE: CPT

## 2019-08-23 PROCEDURE — 80053 COMPREHEN METABOLIC PANEL: CPT

## 2019-08-23 PROCEDURE — 83516 IMMUNOASSAY NONANTIBODY: CPT

## 2019-08-23 PROCEDURE — 82784 ASSAY IGA/IGD/IGG/IGM EACH: CPT

## 2019-08-23 PROCEDURE — 85025 COMPLETE CBC W/AUTO DIFF WBC: CPT | Performed by: INTERNAL MEDICINE

## 2019-08-23 PROCEDURE — 82951 GLUCOSE TOLERANCE TEST (GTT): CPT

## 2019-08-23 PROCEDURE — 84443 ASSAY THYROID STIM HORMONE: CPT

## 2019-08-23 PROCEDURE — 80061 LIPID PANEL: CPT

## 2019-08-26 LAB
ENDOMYSIUM IGA SER QL: NEGATIVE
GLIADIN PEPTIDE IGA SER-ACNC: 7 UNITS (ref 0–19)
GLIADIN PEPTIDE IGG SER-ACNC: 3 UNITS (ref 0–19)
IGA SERPL-MCNC: 229 MG/DL (ref 87–352)
TTG IGA SER-ACNC: <2 U/ML (ref 0–3)
TTG IGG SER-ACNC: 3 U/ML (ref 0–5)

## 2019-09-25 PROCEDURE — 88304 TISSUE EXAM BY PATHOLOGIST: CPT | Performed by: PATHOLOGY

## 2020-01-08 ENCOUNTER — OFFICE VISIT (OUTPATIENT)
Dept: INTERNAL MEDICINE CLINIC | Facility: CLINIC | Age: 45
End: 2020-01-08
Payer: COMMERCIAL

## 2020-01-08 VITALS
BODY MASS INDEX: 32.14 KG/M2 | HEIGHT: 69 IN | RESPIRATION RATE: 18 BRPM | DIASTOLIC BLOOD PRESSURE: 80 MMHG | WEIGHT: 217 LBS | HEART RATE: 86 BPM | OXYGEN SATURATION: 98 % | SYSTOLIC BLOOD PRESSURE: 120 MMHG | TEMPERATURE: 99 F

## 2020-01-08 DIAGNOSIS — I10 BENIGN ESSENTIAL HYPERTENSION: Primary | ICD-10-CM

## 2020-01-08 DIAGNOSIS — Z13.0 SCREENING FOR DEFICIENCY ANEMIA: ICD-10-CM

## 2020-01-08 DIAGNOSIS — E78.2 MIXED HYPERLIPIDEMIA: ICD-10-CM

## 2020-01-08 DIAGNOSIS — E66.9 CLASS 1 OBESITY WITH SERIOUS COMORBIDITY AND BODY MASS INDEX (BMI) OF 33.0 TO 33.9 IN ADULT, UNSPECIFIED OBESITY TYPE: ICD-10-CM

## 2020-01-08 DIAGNOSIS — K58.0 IRRITABLE BOWEL SYNDROME WITH DIARRHEA: ICD-10-CM

## 2020-01-08 PROBLEM — E78.5 HYPERLIPIDEMIA: Status: ACTIVE | Noted: 2020-01-08

## 2020-01-08 PROCEDURE — 3079F DIAST BP 80-89 MM HG: CPT | Performed by: NURSE PRACTITIONER

## 2020-01-08 PROCEDURE — 3008F BODY MASS INDEX DOCD: CPT | Performed by: NURSE PRACTITIONER

## 2020-01-08 PROCEDURE — 3074F SYST BP LT 130 MM HG: CPT | Performed by: NURSE PRACTITIONER

## 2020-01-08 PROCEDURE — 99214 OFFICE O/P EST MOD 30 MIN: CPT | Performed by: NURSE PRACTITIONER

## 2020-01-08 RX ORDER — AMLODIPINE BESYLATE 5 MG/1
5 TABLET ORAL DAILY
Qty: 90 TABLET | Refills: 1 | Status: SHIPPED | OUTPATIENT
Start: 2020-01-08 | End: 2020-07-07

## 2020-01-08 NOTE — PROGRESS NOTES
Assessment/Plan:    Irritable bowel syndrome with diarrhea  On questran with significant improvement in diarrhea  Follow up with colorectal as scheduled    Benign essential hypertension  Well controlled  Continue amlodipine     Hyperlipidemia  ascvd risk score 1 2%   Recheck lipids  Reviewed low cholesterol diet and importance of weight loss        Diagnoses and all orders for this visit:    Benign essential hypertension  -     amLODIPine (NORVASC) 5 mg tablet; Take 1 tablet (5 mg total) by mouth daily  -     Comprehensive metabolic panel; Future    Irritable bowel syndrome with diarrhea    Mixed hyperlipidemia  -     Lipid Panel with Direct LDL reflex; Future    Screening for deficiency anemia  -     CBC and differential; Future    Class 1 obesity with serious comorbidity and body mass index (BMI) of 33 0 to 33 9 in adult, unspecified obesity type  -     TSH, 3rd generation with Free T4 reflex; Future          Subjective:      Patient ID: Audrey Toscano is a 40 y o  female      Here today for follow up  Mable Schmitt is doing well  She has no new concerns/complaints today    She does not check her blood pressure at home but is compliant with medication  She has not been watching her diet     She had a sebaceous cyst removed from left arm pit a few months ago and it is healing well     She has been seeing Dr Manoj Zimmerman for chronic diarrhea, colonoscopy was done 10/2019 and showed diverticulosis   She was tested for lactose intolerance and celiac disease which were normal   She was started on questran once a day and has noticed significant improvement  Her bowel movements are now normal , denies any abdominal cramping                        The following portions of the patient's history were reviewed and updated as appropriate: allergies, current medications, past family history, past medical history, past social history, past surgical history and problem list     Review of Systems   Constitutional: Negative for activity change, appetite change, fatigue and unexpected weight change  Eyes: Negative for visual disturbance  Respiratory: Negative for cough, chest tightness, shortness of breath and wheezing  Cardiovascular: Negative for chest pain, palpitations and leg swelling  Gastrointestinal: Negative for abdominal pain, blood in stool, constipation and diarrhea  Genitourinary: Negative for difficulty urinating  Musculoskeletal: Negative for arthralgias  Skin: Negative for rash  Neurological: Negative for dizziness, weakness, light-headedness and headaches  Psychiatric/Behavioral: Negative for sleep disturbance  Objective:      /80   Pulse 86   Temp 99 °F (37 2 °C) (Oral)   Resp 18   Ht 5' 9" (1 753 m)   Wt 98 4 kg (217 lb)   LMP 06/16/2016   SpO2 98%   BMI 32 05 kg/m²          Physical Exam   Constitutional: She is oriented to person, place, and time  She appears well-developed and well-nourished  HENT:   Head: Normocephalic and atraumatic  Right Ear: External ear normal    Left Ear: External ear normal    Mouth/Throat: Oropharynx is clear and moist    Eyes: Pupils are equal, round, and reactive to light  Conjunctivae are normal    Neck: No thyromegaly present  Cardiovascular: Normal rate, regular rhythm, normal heart sounds and intact distal pulses  Pulmonary/Chest: Effort normal and breath sounds normal    Abdominal: Soft  Bowel sounds are normal    Musculoskeletal: Normal range of motion  She exhibits no edema  Lymphadenopathy:     She has no cervical adenopathy  Neurological: She is alert and oriented to person, place, and time  Skin: Skin is warm and dry  Psychiatric: She has a normal mood and affect  Her behavior is normal    Vitals reviewed

## 2020-07-04 DIAGNOSIS — I10 BENIGN ESSENTIAL HYPERTENSION: ICD-10-CM

## 2020-07-07 RX ORDER — AMLODIPINE BESYLATE 5 MG/1
TABLET ORAL
Qty: 90 TABLET | Refills: 1 | Status: SHIPPED | OUTPATIENT
Start: 2020-07-07 | End: 2020-07-08 | Stop reason: SDUPTHER

## 2020-07-08 ENCOUNTER — OFFICE VISIT (OUTPATIENT)
Dept: INTERNAL MEDICINE CLINIC | Facility: CLINIC | Age: 45
End: 2020-07-08
Payer: COMMERCIAL

## 2020-07-08 VITALS
DIASTOLIC BLOOD PRESSURE: 96 MMHG | OXYGEN SATURATION: 97 % | HEART RATE: 84 BPM | WEIGHT: 220 LBS | SYSTOLIC BLOOD PRESSURE: 132 MMHG | TEMPERATURE: 97.3 F | BODY MASS INDEX: 32.58 KG/M2 | HEIGHT: 69 IN

## 2020-07-08 DIAGNOSIS — K58.0 IRRITABLE BOWEL SYNDROME WITH DIARRHEA: ICD-10-CM

## 2020-07-08 DIAGNOSIS — J45.20 MILD INTERMITTENT ASTHMA WITHOUT COMPLICATION: ICD-10-CM

## 2020-07-08 DIAGNOSIS — E66.9 CLASS 1 OBESITY WITH SERIOUS COMORBIDITY AND BODY MASS INDEX (BMI) OF 33.0 TO 33.9 IN ADULT, UNSPECIFIED OBESITY TYPE: ICD-10-CM

## 2020-07-08 DIAGNOSIS — E78.2 MIXED HYPERLIPIDEMIA: ICD-10-CM

## 2020-07-08 DIAGNOSIS — I10 BENIGN ESSENTIAL HYPERTENSION: Primary | ICD-10-CM

## 2020-07-08 PROBLEM — J45.909 ASTHMA: Status: ACTIVE | Noted: 2020-07-08

## 2020-07-08 PROCEDURE — 1036F TOBACCO NON-USER: CPT | Performed by: NURSE PRACTITIONER

## 2020-07-08 PROCEDURE — 3008F BODY MASS INDEX DOCD: CPT | Performed by: NURSE PRACTITIONER

## 2020-07-08 PROCEDURE — 3080F DIAST BP >= 90 MM HG: CPT | Performed by: NURSE PRACTITIONER

## 2020-07-08 PROCEDURE — 3075F SYST BP GE 130 - 139MM HG: CPT | Performed by: NURSE PRACTITIONER

## 2020-07-08 PROCEDURE — 99214 OFFICE O/P EST MOD 30 MIN: CPT | Performed by: NURSE PRACTITIONER

## 2020-07-08 RX ORDER — AMLODIPINE BESYLATE 10 MG/1
10 TABLET ORAL DAILY
Qty: 30 TABLET | Refills: 2 | Status: SHIPPED | OUTPATIENT
Start: 2020-07-08 | End: 2021-09-28

## 2020-07-08 NOTE — ASSESSMENT & PLAN NOTE
Blood pressure elevated   Increase amlodipine to 10 mg daily   Nurse visit in 2 weeks, bring in home BP machine to check accuracy   Reviewed a low sodium diet

## 2020-07-08 NOTE — ASSESSMENT & PLAN NOTE
BMI Counseling: Body mass index is 32 49 kg/m²  The BMI is above normal  Nutrition recommendations include reducing portion sizes, decreasing overall calorie intake, decreasing soda and/or juice intake, moderation in carbohydrate intake, increasing intake of lean protein and reducing intake of cholesterol  Exercise recommendations include exercising 3-5 times per week

## 2020-07-08 NOTE — PROGRESS NOTES
Assessment/Plan:    Asthma  Takes Singulair nightly   Uses albuterol inhaler PRN  Sees an allergist       Irritable bowel syndrome with diarrhea  On questran daily  Sees colorectal     Benign essential hypertension  Blood pressure elevated   Increase amlodipine to 10 mg daily   Nurse visit in 2 weeks, bring in home BP machine to check accuracy   Reviewed a low sodium diet     Hyperlipidemia  Reviewed low cholesterol diet and importance of exercise 4-5 times/week  Due for lipids    Class 1 obesity with serious comorbidity and body mass index (BMI) of 33 0 to 33 9 in adult  BMI Counseling: Body mass index is 32 49 kg/m²  The BMI is above normal  Nutrition recommendations include reducing portion sizes, decreasing overall calorie intake, decreasing soda and/or juice intake, moderation in carbohydrate intake, increasing intake of lean protein and reducing intake of cholesterol  Exercise recommendations include exercising 3-5 times per week  Diagnoses and all orders for this visit:    Benign essential hypertension  -     amLODIPine (NORVASC) 10 mg tablet; Take 1 tablet (10 mg total) by mouth daily    Mixed hyperlipidemia    Mild intermittent asthma without complication    Irritable bowel syndrome with diarrhea    Class 1 obesity with serious comorbidity and body mass index (BMI) of 33 0 to 33 9 in adult, unspecified obesity type          Subjective:      Patient ID: Mk Nguyen is a 40 y o  female  Here today for follow up  [place] [place] is doing well  She has no new concerns today   Her diarrhea has improved with Mary Jo  She has normal bowel movements daily without any abdominal cramping  She has been compliant with bp meds but does not check her blood pressure at home     She exercises a few days a week but has not been watching her diet   She admits to eating a good amount of junk food lately           The following portions of the patient's history were reviewed and updated as appropriate: allergies, current medications, past family history, past medical history, past social history, past surgical history and problem list     Review of Systems   Constitutional: Negative for activity change, appetite change, fatigue and unexpected weight change  Eyes: Negative for visual disturbance  Respiratory: Negative for cough, chest tightness, shortness of breath and wheezing  Cardiovascular: Negative for chest pain, palpitations and leg swelling  Gastrointestinal: Negative for abdominal pain, blood in stool, constipation and diarrhea  Genitourinary: Negative for difficulty urinating  Musculoskeletal: Negative for arthralgias  Skin: Negative for rash  Neurological: Negative for dizziness, weakness, light-headedness and headaches  Psychiatric/Behavioral: Negative for sleep disturbance  Objective:      /96   Pulse 84   Temp (!) 97 3 °F (36 3 °C)   Ht 5' 9" (1 753 m)   Wt 99 8 kg (220 lb)   LMP 06/16/2016   SpO2 97%   BMI 32 49 kg/m²          Physical Exam   Constitutional: She appears well-developed and well-nourished  HENT:   Head: Normocephalic and atraumatic  Mouth/Throat: Oropharynx is clear and moist    Eyes: Pupils are equal, round, and reactive to light  Conjunctivae are normal    Neck: No thyromegaly present  Cardiovascular: Normal rate, regular rhythm, normal heart sounds and intact distal pulses  Pulmonary/Chest: Effort normal and breath sounds normal    Abdominal: Soft  Bowel sounds are normal    Musculoskeletal: Normal range of motion  She exhibits no edema  Lymphadenopathy:     She has no cervical adenopathy  Neurological: She is alert  Skin: Skin is warm and dry  Psychiatric: She has a normal mood and affect  Her behavior is normal    Vitals reviewed

## 2020-07-20 ENCOUNTER — TELEMEDICINE (OUTPATIENT)
Dept: INTERNAL MEDICINE CLINIC | Facility: CLINIC | Age: 45
End: 2020-07-20
Payer: COMMERCIAL

## 2020-07-20 DIAGNOSIS — Z20.828 EXPOSURE TO SARS-ASSOCIATED CORONAVIRUS: Primary | ICD-10-CM

## 2020-07-20 DIAGNOSIS — Z20.828 EXPOSURE TO SARS-ASSOCIATED CORONAVIRUS: ICD-10-CM

## 2020-07-20 PROCEDURE — 99214 OFFICE O/P EST MOD 30 MIN: CPT | Performed by: NURSE PRACTITIONER

## 2020-07-20 PROCEDURE — U0003 INFECTIOUS AGENT DETECTION BY NUCLEIC ACID (DNA OR RNA); SEVERE ACUTE RESPIRATORY SYNDROME CORONAVIRUS 2 (SARS-COV-2) (CORONAVIRUS DISEASE [COVID-19]), AMPLIFIED PROBE TECHNIQUE, MAKING USE OF HIGH THROUGHPUT TECHNOLOGIES AS DESCRIBED BY CMS-2020-01-R: HCPCS | Performed by: OBSTETRICS & GYNECOLOGY

## 2020-07-20 NOTE — PROGRESS NOTES
COVID-19 Virtual Visit     Assessment/Plan:    Problem List Items Addressed This Visit     None      Visit Diagnoses     Exposure to SARS-associated coronavirus    -  Primary    Relevant Orders    MISC COVID-19 TEST- Collected at [facility] or [facility]          Obtain covid testing and maintain self quarantine  Ventolin inhaler as needed for chest tightness/shortness of breath  This virtual check-in was done via [employer] and patient was informed that this is not a secure, HIPAA-complaint platform  She agrees to proceed     Disposition:      I referred Manisha Smart to one of our centralized sites for a COVID-19 swab    I spent 8 minutes directly with the patient during this visit    Encounter provider Og Lazaro, [facility]    Provider located at [address] [place] [id number]    Recent Visits  No visits were found meeting these conditions  Showing recent visits within past 7 days and meeting all other requirements     Today's Visits  Date Type Provider Dept   07/20/20 [facility] [address] Internal Med   Showing today's visits and meeting all other requirements     Future Appointments  No visits were found meeting these conditions  Showing future appointments within next 150 days and meeting all other requirements        Patient agrees to participate in a virtual check in via telephone or video visit instead of presenting to the office to address urgent/immediate medical needs  Patient is aware this is a billable service  After connecting through [facility], the patient was identified by name and date of birth  Eulalia Salgado was informed that this was a telemedicine visit and that the exam was being conducted confidentially over secure lines  My office door was closed  No one else was in the room    Eulalia Salgado acknowledged consent and understanding of privacy and security of the telemedicine visit  I informed the patient that I have reviewed her record in Epic and presented the opportunity for her to ask any questions regarding the visit today  The patient agreed to participate  Subjective  Osvaldo Oakes is a 40 y o  female who is concerned about COVID-19  She reports cough, shortness of breath, headache and fatigue  She has not experienced fever, chills, abdominal pain, diarrhea, nausea and vomiting She has not had contact with a person who is under investigation for or who is positive for COVID-19 within the last 14 days  She has not been hospitalized recently for fever and/or lower respiratory symptoms  Croghansol Henao reports starting with symptoms on Saturday  She typically has bad allergies but this has felt a little different  She has nasal congestion/runny nose, dry cough, chest tightness, and an intermittent headache  She has no fevers or loss of taste or smell  No known covid exposure      On singulair, claritin       Past Medical History:   Diagnosis Date    Anxiety     Asthma     Blood pressure elevated without history of HTN     Last Assessed:6/30/2012    Depression     GERD (gastroesophageal reflux disease)     History of herpes simplex infection     History of hypertension     History of palpitations     Hyperlipidemia     IBS (irritable bowel syndrome)     Microscopic hematuria     Last Assessed:6/10/2014    Obesity     Seasonal allergies     Superficial thrombophlebitis of leg     Vitamin D deficiency        Past Surgical History:   Procedure Laterality Date    COLONOSCOPY      Last Assessed:7/9/2012    DENTAL SURGERY      MN ENLARGE BREAST WITH IMPLANT Bilateral 6/29/2016    Procedure: AUGMENTATION BREAST;  Surgeon: Mary Jefferson MD;  Location:  MAIN OR;  Service: Plastics    MN LAP,VAG HYST,UTERUS 250GMS/< N/A 6/30/2017    Procedure: LAPAROSCOPIC ASSISTED VAGINAL HYSTERECTOMY AND BILATERAL REMOVAL OF Suszanne Doom;  Surgeon: Titus Vicente Corrie Hernandez MD;  Location[facility];  Service: Gynecology       Current Outpatient Medications   Medication Sig Dispense Refill    albuterol (PROVENTIL HFA,VENTOLIN HFA) 90 mcg/act inhaler Inhale 2 puffs every 6 (six) hours as needed for wheezing   amLODIPine (NORVASC) 10 mg tablet Take 1 tablet (10 mg total) by mouth daily 30 tablet 2    Blood Pressure KIT by Does not apply route 2 (two) times a week 1 each 0    cholestyramine (QUESTRAN) 4 g packet Take 1 packet (4 g total) by mouth daily at bedtime as needed (Loose bowel movements) 60 packet 2    loratadine (CLARITIN) 10 mg tablet Take 10 mg by mouth daily      montelukast (SINGULAIR) 10 mg tablet Take 10 mg by mouth daily at bedtime  Pt not dure of dose, advised to bring list am of surgery      Multiple Vitamin (MULTIVITAMIN) capsule Take 1 capsule by mouth daily      valACYclovir (VALTREX) 1,000 mg tablet Take 1 tablet (1,000 mg total) by mouth 2 (two) times a day as needed (cold sores) for up to 2 days 20 tablet 1     No current facility-administered medications for this visit  Allergies   Allergen Reactions    Erythromycin      Other reaction(s): GI Upset       Review of Systems   Constitutional: Positive for fatigue  Negative for activity change, appetite change, chills and fever  HENT: Positive for congestion, postnasal drip and rhinorrhea  Negative for ear pain and sore throat  Respiratory: Positive for cough, chest tightness and shortness of breath  Negative for wheezing  Cardiovascular: Negative for chest pain, palpitations and leg swelling  Gastrointestinal: Negative for abdominal pain, diarrhea, nausea and vomiting  Musculoskeletal: Negative for myalgias  Neurological: Positive for headaches  Negative for dizziness and light-headedness  Video Exam    There were no vitals filed for this visit  Vega Pike appears alert, no distress, cooperative    Physical Exam   Constitutional: She appears well-developed and well-nourished  HENT:   Head: Normocephalic  Eyes: Pupils are equal, round, and reactive to light  Pulmonary/Chest: Effort normal  No respiratory distress  Neurological: She is alert  Psychiatric: She has a normal mood and affect  Her behavior is normal         VIRTUAL VISIT DISCLAIMER    Marisabelmilton Grandaal acknowledges that she has consented to an online visit or consultation  She understands that the online visit is based solely on information provided by her, and that, in the absence of a face-to-face physical evaluation by the physician, the diagnosis she receives is both limited and provisional in terms of accuracy and completeness  This is not intended to replace a full medical face-to-face evaluation by the physician  Marisabel Jamison understands and accepts these terms

## 2020-07-21 LAB
INPATIENT: NORMAL
SARS-COV-2 RNA SPEC QL NAA+PROBE: NOT DETECTED

## 2020-07-23 ENCOUNTER — TELEPHONE (OUTPATIENT)
Dept: INTERNAL MEDICINE CLINIC | Facility: CLINIC | Age: 45
End: 2020-07-23

## 2020-07-23 NOTE — TELEPHONE ENCOUNTER
Pt canceled Nurse Visit for a BP check today    States her COVID test was negative but not feeling 100 % so she wants to wait to come in

## 2020-07-29 ENCOUNTER — TELEPHONE (OUTPATIENT)
Dept: INTERNAL MEDICINE CLINIC | Facility: CLINIC | Age: 45
End: 2020-07-29

## 2020-07-29 NOTE — TELEPHONE ENCOUNTER
Yes if they have managed her asthma in the past    If there is a delay to get an appointment, she can schedule here this week and I can trial her on another inhaler or treatment prior to her allergist appointment

## 2020-07-29 NOTE — TELEPHONE ENCOUNTER
The cdc updated their recommendations as of July 16  Masking with asthma does not lower oxygen levels or cause harm if asthma is well controlled  They recommend keeping asthma under good control  She can try different mask fabrics and fits  They recommend 100% cotton which is more breathable  How has her asthma been? Is she feeling better than when I talked to her last week? If her asthma is not well controlled, she may need another inhaler   Also, to help with mask tolerability, she may need to take more breaks at work, I can give her a letter stating so if she needs it (she may want to discuss accommodations with her employer as well)

## 2020-07-29 NOTE — TELEPHONE ENCOUNTER
It has not been under control  She is not feeling any better  Sees an allergist, do you want her to follow up with the allergist?

## 2020-07-29 NOTE — TELEPHONE ENCOUNTER
Pt  said she saw Soundra Counter four wks  ago in the office  She said Beverly Jim said she would give her a letter stating that it is not a good idea that she go back to work because of her asthma-she has difficulty breathing w/ a mask on  Pt  decided she would like a note

## 2020-12-07 ENCOUNTER — VBI (OUTPATIENT)
Dept: ADMINISTRATIVE | Facility: OTHER | Age: 45
End: 2020-12-07

## 2020-12-28 DIAGNOSIS — B00.1 RECURRENT COLD SORES: ICD-10-CM

## 2020-12-30 RX ORDER — VALACYCLOVIR HYDROCHLORIDE 1 G/1
1000 TABLET, FILM COATED ORAL 2 TIMES DAILY PRN
Qty: 20 TABLET | Refills: 0 | Status: SHIPPED | OUTPATIENT
Start: 2020-12-30 | End: 2021-01-01

## 2021-02-09 ENCOUNTER — TELEPHONE (OUTPATIENT)
Dept: INTERNAL MEDICINE CLINIC | Facility: CLINIC | Age: 46
End: 2021-02-09

## 2021-02-09 NOTE — TELEPHONE ENCOUNTER
I have no problem filling reviewing the paperwork at her upcoming appointment  I'm having a hard time with having to check off that she's disabled and that I would be ok testifying in court if necessary regarding this disability  Does she still see the allergist that is treating her asthma? They may have more information regarding the asthma then I'm able to provide?

## 2021-02-09 NOTE — TELEPHONE ENCOUNTER
Patient states she registered the service animal under Asthma  It is one of the 12 conditions that are Approved       States she had asthma as a kid, grew out of it, but it has been back for about 5-6 years now     Patient does have appt on 02/17

## 2021-02-09 NOTE — TELEPHONE ENCOUNTER
Please call her and let her know I looked over the paperwork  I'm not sure what I'm filling it our for? What disability does she have for the service animal? I do not have any diagnoses on her chart that would indicate a disability? Just looking for more information  Regarding: Non-Urgent Medical Question  Contact: [phone]  ----- Message from Jean Connors sent at 2/9/2021 11:32 AM EST -----       ----- Message from Tara Cornejo to ADRIENNE Richards sent at 2/9/2021 11:30 AM -----   Hello,    Attached is the medical form we spoke about       Thank you,  Declan Palma

## 2021-02-09 NOTE — TELEPHONE ENCOUNTER
Patient notified , will send form through [employer] for provider to look over , does have appt on 02/17

## 2021-02-09 NOTE — TELEPHONE ENCOUNTER
Would you be willing to sign a Reasonable Accommodation/ Modification Verification form  Her dog is registered is a service dog and she needs form singed by Doctor  She moved into a new apartment

## 2021-02-16 ENCOUNTER — TELEMEDICINE (OUTPATIENT)
Dept: INTERNAL MEDICINE CLINIC | Facility: CLINIC | Age: 46
End: 2021-02-16
Payer: COMMERCIAL

## 2021-02-16 DIAGNOSIS — B34.9 VIRAL INFECTION, UNSPECIFIED: ICD-10-CM

## 2021-02-16 DIAGNOSIS — Z20.822 EXPOSURE TO COVID-19 VIRUS: ICD-10-CM

## 2021-02-16 PROCEDURE — 99213 OFFICE O/P EST LOW 20 MIN: CPT | Performed by: NURSE PRACTITIONER

## 2021-02-16 PROCEDURE — U0003 INFECTIOUS AGENT DETECTION BY NUCLEIC ACID (DNA OR RNA); SEVERE ACUTE RESPIRATORY SYNDROME CORONAVIRUS 2 (SARS-COV-2) (CORONAVIRUS DISEASE [COVID-19]), AMPLIFIED PROBE TECHNIQUE, MAKING USE OF HIGH THROUGHPUT TECHNOLOGIES AS DESCRIBED BY CMS-2020-01-R: HCPCS | Performed by: NURSE PRACTITIONER

## 2021-02-16 PROCEDURE — U0005 INFEC AGEN DETEC AMPLI PROBE: HCPCS | Performed by: NURSE PRACTITIONER

## 2021-02-16 NOTE — PROGRESS NOTES
COVID-19 Virtual Visit     Assessment/Plan:    Problem List Items Addressed This Visit     None      Visit Diagnoses     Exposure to COVID-19 virus        Relevant Orders    Novel Coronavirus (Covid-19),PCR [facility] - Collected at [facility] or [facility]    Viral infection, unspecified        Relevant Orders    Novel Coronavirus (Covid-19),PCR [facility] - Collected at [facility] or [facility]         Disposition:     I referred patient to one of our centralized sites for a COVID-19 swab  Self quarantine until results  I have spent 10 minutes directly with the patient  Greater than 50% of this time was spent in counseling/coordination of care regarding: patient and family education  Encounter provider ADRIENNE Love    Provider located at [address] [place] [phone]    Recent Visits  Date Type Provider Dept   02/09/21 Telephone Em Moreno, [address] Internal Med   [facility]   Showing recent visits within past 7 days and meeting all other requirements     Today's Visits  Date Type Provider Dept   02/16/21 [facility], [address] Internal Med   Showing today's visits and meeting all other requirements     Future Appointments  No visits were found meeting these conditions  Showing future appointments within next 150 days and meeting all other requirements      This virtual check-in was done via [employer] and patient was informed that this is a secure, HIPAA-compliant platform  She agrees to proceed  Patient agrees to participate in a virtual check in via telephone or video visit instead of presenting to the office to address urgent/immediate medical needs  Patient is aware this is a billable service  After connecting through [facility], the patient was identified by name and date of birth   Jeremiah House was informed that this was a telemedicine visit and that the exam was being conducted confidentially over secure lines  My office door was closed  No one else was in the room  Mak Talamantes acknowledged consent and understanding of privacy and security of the telemedicine visit  I informed the patient that I have reviewed her record in Epic and presented the opportunity for her to ask any questions regarding the visit today  The patient agreed to participate  Subjective: Mak Talamantes is a 39 y o  female who is concerned about COVID-19  Patient's symptoms include chills, fatigue, malaise, nasal congestion, cough, nausea, diarrhea and headache  Patient denies rhinorrhea, sore throat, anosmia, loss of taste, shortness of breath, chest tightness and vomiting  Date of symptom onset: 2/11/2021    Exposure:   Contact with a person who is under investigation (PUI) for or who is positive for COVID-19 within the last 14 days?: Yes    Hospitalized recently for fever and/or lower respiratory symptoms?: No      Currently a healthcare worker that is involved in direct patient care?: No      Works in a special setting where the risk of COVID-19 transmission may be high? (this may include long-term care, correctional and shelter facilities; homeless shelters; assisted-living facilities and group homes ): No      Resident in a special setting where the risk of COVID-19 transmission may be high? (this may include long-term care, correctional and shelter facilities; homeless shelters; assisted-living facilities and group homes ): No      Sandhya Anna is not feeling feel  Her symptoms started about 5 days ago  She has chills, PND, nasal congestion, dry cough, and nausea  She had some diarrhea a few days ago  She denies shortness of breath  She had an exposure about a week ago         Lab Results   Component Value Date    SARSCOV2 Not Detected 07/20/2020     Past Medical History:   Diagnosis Date    Anxiety     Asthma     Blood pressure elevated without history of HTN     Last Assessed:6/30/2012    Depression     GERD (gastroesophageal reflux disease)     History of herpes simplex infection     History of hypertension     History of palpitations     Hyperlipidemia     IBS (irritable bowel syndrome)     Microscopic hematuria     Last Assessed:6/10/2014    Obesity     Seasonal allergies     Superficial thrombophlebitis of leg     Vitamin D deficiency      Past Surgical History:   Procedure Laterality Date    COLONOSCOPY      Last Assessed:7/9/2012    DENTAL SURGERY      ID BREAST AUGMENTATION WITH IMPLANT Bilateral 6/29/2016    Procedure: AUGMENTATION BREAST;  Surgeon: Cruz Cano MD;  Location:  MAIN OR;  Service: Plastics    ID LAP,VAG HYST,UTERUS 250GMS/< N/A 6/30/2017    Procedure: LAPAROSCOPIC ASSISTED VAGINAL HYSTERECTOMY AND BILATERAL REMOVAL OF Ramya Moss;  Surgeon: Rosanna Angelo MD;  Location:  MAIN OR;  Service: Gynecology     Current Outpatient Medications   Medication Sig Dispense Refill    albuterol (PROVENTIL HFA,VENTOLIN HFA) 90 mcg/act inhaler Inhale 2 puffs every 6 (six) hours as needed for wheezing   amLODIPine (NORVASC) 10 mg tablet Take 1 tablet (10 mg total) by mouth daily 30 tablet 2    Blood Pressure KIT by Does not apply route 2 (two) times a week 1 each 0    cholestyramine (QUESTRAN) 4 g packet USE 1 PACKET AT BEDTIME AS NEEDED FOR LOOSE BOWEL MOVEMENTS 90 packet 3    loratadine (CLARITIN) 10 mg tablet Take 10 mg by mouth daily      montelukast (SINGULAIR) 10 mg tablet Take 10 mg by mouth daily at bedtime  Pt not dure of dose, advised to bring list am of surgery      Multiple Vitamin (MULTIVITAMIN) capsule Take 1 capsule by mouth daily      valACYclovir (VALTREX) 1,000 mg tablet Take 1 tablet (1,000 mg total) by mouth 2 (two) times a day as needed (cold sores) for up to 2 days 20 tablet 0     No current facility-administered medications for this visit        Allergies Allergen Reactions    Erythromycin      Other reaction(s): GI Upset       Review of Systems   Constitutional: Positive for chills and fatigue  HENT: Positive for congestion  Negative for rhinorrhea and sore throat  Respiratory: Positive for cough  Negative for chest tightness and shortness of breath  Gastrointestinal: Positive for diarrhea and nausea  Negative for vomiting  Neurological: Positive for headaches  Objective: There were no vitals filed for this visit  Physical Exam  Constitutional:       Appearance: She is well-developed  HENT:      Head: Normocephalic  Eyes:      Pupils: Pupils are equal, round, and reactive to light  Pulmonary:      Effort: Pulmonary effort is normal    Neurological:      Mental Status: She is alert  Psychiatric:         Behavior: Behavior normal        VIRTUAL VISIT DISCLAIMER    Vikakarrie Harsh acknowledges that she has consented to an online visit or consultation  She understands that the online visit is based solely on information provided by her, and that, in the absence of a face-to-face physical evaluation by the physician, the diagnosis she receives is both limited and provisional in terms of accuracy and completeness  This is not intended to replace a full medical face-to-face evaluation by the physician  Jeremiah House understands and accepts these terms

## 2021-02-17 LAB — SARS-COV-2 RNA RESP QL NAA+PROBE: POSITIVE

## 2021-02-19 ENCOUNTER — TELEMEDICINE (OUTPATIENT)
Dept: INTERNAL MEDICINE CLINIC | Facility: CLINIC | Age: 46
End: 2021-02-19
Payer: COMMERCIAL

## 2021-02-19 ENCOUNTER — TELEPHONE (OUTPATIENT)
Dept: INTERNAL MEDICINE CLINIC | Facility: CLINIC | Age: 46
End: 2021-02-19

## 2021-02-19 DIAGNOSIS — U07.1 COVID-19: Primary | ICD-10-CM

## 2021-02-19 PROCEDURE — 99213 OFFICE O/P EST LOW 20 MIN: CPT | Performed by: NURSE PRACTITIONER

## 2021-02-19 NOTE — TELEPHONE ENCOUNTER
Spoke with patient  She was in contact with the apartment and said she does not feel comfortable signing those forms , they are asking for too much and she told them we did not feel comfortable, but maybe they need to hear that from the office      Patient wants to know if we can call them back and state we  Do not feel comfortable filling them out

## 2021-02-19 NOTE — TELEPHONE ENCOUNTER
The [facility] complex called, they received your letter but they need to form filled out      Fax: [phone]

## 2021-02-19 NOTE — TELEPHONE ENCOUNTER
I do not feel comfortable filling out the form  Can she reach out to her allergist who is treating her asthma to possibly fill out the form?

## 2021-02-19 NOTE — PROGRESS NOTES
COVID-19 Virtual Visit     Assessment/Plan:    Problem List Items Addressed This Visit        Other    COVID-19 - Primary     Continue quarantine until Monday as long as symptoms improve  Symptomatic treatment as needed  Disposition:     I recommended continued isolation until at least 24 hours have passed since recovery defined as resolution of fever without the use of fever-reducing medications AND improvement in COVID symptoms AND 10 days have passed since onset of symptoms (or 10 days have passed since date of first positive viral diagnostic test for asymptomatic patients)  I have spent 8 minutes directly with the patient  Greater than 50% of this time was spent in counseling/coordination of care regarding: instructions for management and patient and family education  Encounter provider ADRIENNE Gomez    Provider located at [address] [place] [phone]    Recent Visits  Date Type Provider Dept   02/16/21 Telemedicine Dorothea Roque, [address] Internal Med   Showing recent visits within past 7 days and meeting all other requirements     Today's Visits  Date Type Provider Dept   02/19/21 [facility] [address] Internal Med   Showing today's visits and meeting all other requirements     Future Appointments  No visits were found meeting these conditions  Showing future appointments within next 150 days and meeting all other requirements      This virtual check-in was done via [employer] and patient was informed that this is a secure, HIPAA-compliant platform  She agrees to proceed  Patient agrees to participate in a virtual check in via telephone or video visit instead of presenting to the office to address urgent/immediate medical needs  Patient is aware this is a billable service      After connecting through [facility], the patient was identified by name and date of birth  Sabina Umaña was informed that this was a telemedicine visit and that the exam was being conducted confidentially over secure lines  My office door was closed  No one else was in the room  Sabina Umaña acknowledged consent and understanding of privacy and security of the telemedicine visit  I informed the patient that I have reviewed her record in Epic and presented the opportunity for her to ask any questions regarding the visit today  The patient agreed to participate  Subjective: Sabina Umaña is a 39 y o  female who has been screened for COVID-19  Symptom change since last report: improving  Patient's symptoms include fatigue, malaise, anosmia, loss of taste, nausea, myalgias and headache  Patient denies fever, chills, congestion, rhinorrhea, sore throat, cough, shortness of breath, chest tightness, vomiting and diarrhea  Yola Bradley has been staying home and has isolated themselves in her home  She is taking care to not share personal items and is cleaning all surfaces that are touched often, like counters, tabletops, and doorknobs using household cleaning sprays or wipes  She is wearing a mask when she leaves her room  Date of symptom onset: 2/11/2021  Date of positive COVID-19 PCR: 2/16/2021    Chung Morales is feeling a little better today  She does have muscle aches and a headache that are relieved with advil  She had a fever Wednesday but nothing since  She has some nausea but is eating and drinking ok        Lab Results   Component Value Date    SARSCOV2 Positive (A) 02/16/2021    SARSCOV2 Not Detected 07/20/2020     Past Medical History:   Diagnosis Date    Anxiety     Asthma     Blood pressure elevated without history of HTN     Last Assessed:6/30/2012    Depression     GERD (gastroesophageal reflux disease)     History of herpes simplex infection     History of hypertension     History of palpitations     Hyperlipidemia     IBS (irritable bowel syndrome)  Microscopic hematuria     Last Assessed:6/10/2014    Obesity     Seasonal allergies     Superficial thrombophlebitis of leg     Vitamin D deficiency      Past Surgical History:   Procedure Laterality Date    COLONOSCOPY      Last Assessed:7/9/2012    DENTAL SURGERY      CA BREAST AUGMENTATION WITH IMPLANT Bilateral 6/29/2016    Procedure: AUGMENTATION BREAST;  Surgeon: Manoj Melara MD;  Location:  MAIN OR;  Service: Plastics    CA LAP,VAG HYST,UTERUS 250GMS/< N/A 6/30/2017    Procedure: LAPAROSCOPIC ASSISTED VAGINAL HYSTERECTOMY AND BILATERAL REMOVAL OF Kolkarrie Aguilarmins;  Surgeon: Maru Friedman MD;  Location:  MAIN OR;  Service: Gynecology     Current Outpatient Medications   Medication Sig Dispense Refill    albuterol (PROVENTIL HFA,VENTOLIN HFA) 90 mcg/act inhaler Inhale 2 puffs every 6 (six) hours as needed for wheezing   amLODIPine (NORVASC) 10 mg tablet Take 1 tablet (10 mg total) by mouth daily 30 tablet 2    Blood Pressure KIT by Does not apply route 2 (two) times a week 1 each 0    cholestyramine (QUESTRAN) 4 g packet USE 1 PACKET AT BEDTIME AS NEEDED FOR LOOSE BOWEL MOVEMENTS 90 packet 3    loratadine (CLARITIN) 10 mg tablet Take 10 mg by mouth daily      montelukast (SINGULAIR) 10 mg tablet Take 10 mg by mouth daily at bedtime  Pt not dure of dose, advised to bring list am of surgery      Multiple Vitamin (MULTIVITAMIN) capsule Take 1 capsule by mouth daily      valACYclovir (VALTREX) 1,000 mg tablet Take 1 tablet (1,000 mg total) by mouth 2 (two) times a day as needed (cold sores) for up to 2 days 20 tablet 0     No current facility-administered medications for this visit  Allergies   Allergen Reactions    Erythromycin      Other reaction(s): GI Upset       Review of Systems   Constitutional: Positive for fatigue  Negative for chills and fever  HENT: Negative for congestion, rhinorrhea and sore throat      Respiratory: Negative for cough, chest tightness and shortness of breath  Gastrointestinal: Positive for nausea  Negative for diarrhea and vomiting  Musculoskeletal: Positive for myalgias  Neurological: Positive for headaches  Objective: There were no vitals filed for this visit  Physical Exam  Constitutional:       Appearance: She is well-developed  HENT:      Head: Normocephalic  Eyes:      Pupils: Pupils are equal, round, and reactive to light  Pulmonary:      Effort: Pulmonary effort is normal    Neurological:      Mental Status: She is alert  Psychiatric:         Behavior: Behavior normal        VIRTUAL VISIT DISCLAIMER    Audrey Toscano acknowledges that she has consented to an online visit or consultation  She understands that the online visit is based solely on information provided by her, and that, in the absence of a face-to-face physical evaluation by the physician, the diagnosis she receives is both limited and provisional in terms of accuracy and completeness  This is not intended to replace a full medical face-to-face evaluation by the physician  Audrey Toscano understands and accepts these terms

## 2021-02-22 ENCOUNTER — TELEMEDICINE (OUTPATIENT)
Dept: INTERNAL MEDICINE CLINIC | Facility: CLINIC | Age: 46
End: 2021-02-22
Payer: COMMERCIAL

## 2021-02-22 ENCOUNTER — TELEPHONE (OUTPATIENT)
Dept: INTERNAL MEDICINE CLINIC | Facility: CLINIC | Age: 46
End: 2021-02-22

## 2021-02-22 DIAGNOSIS — U07.1 COVID-19: Primary | ICD-10-CM

## 2021-02-22 PROCEDURE — 99213 OFFICE O/P EST LOW 20 MIN: CPT | Performed by: NURSE PRACTITIONER

## 2021-02-22 RX ORDER — BENZONATATE 100 MG/1
100 CAPSULE ORAL 3 TIMES DAILY PRN
Qty: 30 CAPSULE | Refills: 0 | Status: SHIPPED | OUTPATIENT
Start: 2021-02-22 | End: 2021-03-17

## 2021-02-22 NOTE — PROGRESS NOTES
COVID-19 Virtual Visit     Assessment/Plan:    Problem List Items Addressed This Visit        Other    COVID-19 - Primary     Continue quarantine until Thursday as long as symptoms continue to improve  Tessalon perles for cough  Monitor for shortness of breath  Relevant Medications    benzonatate (TESSALON PERLES) 100 mg capsule         Disposition:     I recommended continued isolation until at least 24 hours have passed since recovery defined as resolution of fever without the use of fever-reducing medications AND improvement in COVID symptoms AND 10 days have passed since onset of symptoms (or 10 days have passed since date of first positive viral diagnostic test for asymptomatic patients)  I have spent 14 minutes directly with the patient  Greater than 50% of this time was spent in counseling/coordination of care regarding: patient and family education  Encounter provider ADRIENNE Price    Provider located at [address] [place] [id number]    Recent Visits  Date Type Provider Dept   02/19/21 Telephone 83 [facility] Internal Med   [facility], [address] [place] [address],[place] [address] [facility]   [facility], [address] [place] [address],[place] [address] [facility]   Showing recent visits within past 7 days and meeting all other requirements     Today's Visits  Date Type Provider Dept   [facility]22/21 [facility], [address] [place] [address],[place] [address] [facility]   Showing today's visits and meeting all other requirements     Future Appointments  No visits were found meeting these conditions  Showing future appointments within next 150 days and meeting all other requirements      This virtual check-in was done via Pango and patient was informed that this is a secure, HIPAA-compliant platform  She agrees to proceed      Patient agrees to participate in a virtual check in via telephone or video visit instead of presenting to the office to address urgent/immediate medical needs  Patient is aware this is a billable service  After connecting through [facility], the patient was identified by name and date of birth  Ruddy Do was informed that this was a telemedicine visit and that the exam was being conducted confidentially over secure lines  My office door was closed  No one else was in the room  Ruddy Do acknowledged consent and understanding of privacy and security of the telemedicine visit  I informed the patient that I have reviewed her record in Epic and presented the opportunity for her to ask any questions regarding the visit today  The patient agreed to participate  Subjective: Ruddy Do is a 39 y o  female who has been screened for COVID-19  Symptom change since last report: resolving  Patient's symptoms include anosmia and cough  Patient denies fever, chills, fatigue, malaise, congestion, sore throat, loss of taste, shortness of breath, chest tightness, nausea, vomiting, diarrhea, myalgias and headaches  Herlinda Leal has been staying home and has isolated themselves in her home  She is taking care to not share personal items and is cleaning all surfaces that are touched often, like counters, tabletops, and doorknobs using household cleaning sprays or wipes  She is wearing a mask when she leaves her room  Date of symptom onset: 2/11/2021  Date of positive COVID-19 PCR: 2/16/2021    Overall Herlinda Lela is feeling better however she developed a moist productive cough on Friday  Since then the cough has become a dry cough  She denies shortness of breath or wheezing  She has not taken anything OTC  She has had no recent fevers          Lab Results   Component Value Date    SARSCOV2 Positive (A) 02/16/2021    SARSCOV2 Not Detected 07/20/2020     Past Medical History:   Diagnosis Date    Anxiety     Asthma     Blood pressure elevated without history of HTN Last Assessed:6/30/2012    Depression     GERD (gastroesophageal reflux disease)     History of herpes simplex infection     History of hypertension     History of palpitations     Hyperlipidemia     IBS (irritable bowel syndrome)     Microscopic hematuria     Last Assessed:6/10/2014    Obesity     Seasonal allergies     Superficial thrombophlebitis of leg     Vitamin D deficiency      Past Surgical History:   Procedure Laterality Date    COLONOSCOPY      Last Assessed:7/9/2012    DENTAL SURGERY      MO BREAST AUGMENTATION WITH IMPLANT Bilateral 6/29/2016    Procedure: AUGMENTATION BREAST;  Surgeon: Hollie Banks MD;  Location: [facility];  Service: Plastics    MO LAP,VAG HYST,UTERUS 250GMS/< N/A 6/30/2017    Procedure: LAPAROSCOPIC ASSISTED VAGINAL HYSTERECTOMY AND BILATERAL REMOVAL OF Shelia Chin;  Surgeon: Landy Baker MD;  Location: [facility];  Service: Gynecology     Current Outpatient Medications   Medication Sig Dispense Refill    albuterol (PROVENTIL HFA,VENTOLIN HFA) 90 mcg/act inhaler Inhale 2 puffs every 6 (six) hours as needed for wheezing   amLODIPine (NORVASC) 10 mg tablet Take 1 tablet (10 mg total) by mouth daily 30 tablet 2    benzonatate (TESSALON PERLES) 100 mg capsule Take 1 capsule (100 mg total) by mouth 3 (three) times a day as needed for cough 30 capsule 0    Blood Pressure KIT by Does not apply route 2 (two) times a week 1 each 0    cholestyramine (QUESTRAN) 4 g packet USE 1 PACKET AT BEDTIME AS NEEDED FOR LOOSE BOWEL MOVEMENTS 90 packet 3    loratadine (CLARITIN) 10 mg tablet Take 10 mg by mouth daily      montelukast (SINGULAIR) 10 mg tablet Take 10 mg by mouth daily at bedtime   Pt not dure of dose, advised to bring list am of surgery      Multiple Vitamin (MULTIVITAMIN) capsule Take 1 capsule by mouth daily      valACYclovir (VALTREX) 1,000 mg tablet Take 1 tablet (1,000 mg total) by mouth 2 (two) times a day as needed (cold sores) for up to 2 days 20 tablet 0     No current facility-administered medications for this visit  Allergies   Allergen Reactions    Erythromycin      Other reaction(s): GI Upset       Review of Systems   Constitutional: Negative for chills, fatigue and fever  HENT: Negative for congestion and sore throat  Respiratory: Positive for cough  Negative for chest tightness and shortness of breath  Gastrointestinal: Negative for diarrhea, nausea and vomiting  Musculoskeletal: Negative for myalgias  Neurological: Negative for headaches  Objective: There were no vitals filed for this visit  Physical Exam  Constitutional:       Appearance: She is well-developed  HENT:      Head: Normocephalic  Eyes:      Pupils: Pupils are equal, round, and reactive to light  Pulmonary:      Effort: Pulmonary effort is normal    Neurological:      Mental Status: She is alert  Psychiatric:         Behavior: Behavior normal        VIRTUAL VISIT DISCLAIMER    Aniceto Carmen acknowledges that she has consented to an online visit or consultation  She understands that the online visit is based solely on information provided by her, and that, in the absence of a face-to-face physical evaluation by the physician, the diagnosis she receives is both limited and provisional in terms of accuracy and completeness  This is not intended to replace a full medical face-to-face evaluation by the physician  Aniceto Carmen understands and accepts these terms

## 2021-02-22 NOTE — TELEPHONE ENCOUNTER
Patient gave permission for use to call [facility] [facility]  Please let them know that the letter provided to them should be sufficient and I will not be filling out the paperwork  [phone], can ask for Srikanth Talbot

## 2021-02-22 NOTE — TELEPHONE ENCOUNTER
Allan Avery states that they are just following their polices  She stated [place] may just have to end up paying  A Pet rental fee if the paperwork isn't filled out      States she will speak with her  who is on vacation this week and reach out to either us or the patient if there is anything else needed

## 2021-02-22 NOTE — ASSESSMENT & PLAN NOTE
Continue quarantine until Thursday as long as symptoms continue to improve  Tessalon perles for cough  Monitor for shortness of breath

## 2021-03-15 RX ORDER — AMLODIPINE BESYLATE 5 MG/1
TABLET ORAL
COMMUNITY
Start: 2021-02-01 | End: 2021-03-31

## 2021-03-15 RX ORDER — MONTELUKAST SODIUM 10 MG/1
TABLET ORAL
COMMUNITY
End: 2021-03-17

## 2021-03-15 RX ORDER — MULTIVIT-MIN/IRON/FOLIC ACID/K 18-600-40
CAPSULE ORAL EVERY 24 HOURS
COMMUNITY

## 2021-03-17 ENCOUNTER — OFFICE VISIT (OUTPATIENT)
Dept: INTERNAL MEDICINE CLINIC | Facility: CLINIC | Age: 46
End: 2021-03-17
Payer: COMMERCIAL

## 2021-03-17 VITALS
DIASTOLIC BLOOD PRESSURE: 82 MMHG | BODY MASS INDEX: 31.28 KG/M2 | TEMPERATURE: 97.1 F | HEART RATE: 82 BPM | SYSTOLIC BLOOD PRESSURE: 130 MMHG | HEIGHT: 69 IN | OXYGEN SATURATION: 98 % | WEIGHT: 211.2 LBS

## 2021-03-17 DIAGNOSIS — E66.09 CLASS 1 OBESITY DUE TO EXCESS CALORIES WITHOUT SERIOUS COMORBIDITY WITH BODY MASS INDEX (BMI) OF 31.0 TO 31.9 IN ADULT: ICD-10-CM

## 2021-03-17 DIAGNOSIS — E78.2 MIXED HYPERLIPIDEMIA: ICD-10-CM

## 2021-03-17 DIAGNOSIS — R21 RASH: ICD-10-CM

## 2021-03-17 DIAGNOSIS — I10 BENIGN ESSENTIAL HYPERTENSION: Primary | ICD-10-CM

## 2021-03-17 DIAGNOSIS — J45.20 MILD INTERMITTENT ASTHMA WITHOUT COMPLICATION: ICD-10-CM

## 2021-03-17 DIAGNOSIS — U07.1 COVID-19: ICD-10-CM

## 2021-03-17 DIAGNOSIS — K58.0 IRRITABLE BOWEL SYNDROME WITH DIARRHEA: ICD-10-CM

## 2021-03-17 PROCEDURE — 99214 OFFICE O/P EST MOD 30 MIN: CPT | Performed by: NURSE PRACTITIONER

## 2021-03-17 PROCEDURE — 3725F SCREEN DEPRESSION PERFORMED: CPT | Performed by: NURSE PRACTITIONER

## 2021-03-17 PROCEDURE — 3079F DIAST BP 80-89 MM HG: CPT | Performed by: NURSE PRACTITIONER

## 2021-03-17 PROCEDURE — 1036F TOBACCO NON-USER: CPT | Performed by: NURSE PRACTITIONER

## 2021-03-17 PROCEDURE — 3075F SYST BP GE 130 - 139MM HG: CPT | Performed by: NURSE PRACTITIONER

## 2021-03-17 PROCEDURE — 3008F BODY MASS INDEX DOCD: CPT | Performed by: NURSE PRACTITIONER

## 2021-03-17 NOTE — PROGRESS NOTES
Assessment/Plan:    Irritable bowel syndrome with diarrhea  Stable  On Mary Jo daily  Asthma  Stable  On singulair daily  Albuterol PRN  Follows with an allergist      Benign essential hypertension  Blood pressure is acceptable  Continue current dose of amlodipine  Monitor blood pressure at home and call the office in 1-2 weeks with results  Follow a low sodium diet     Hyperlipidemia  Overdue for lipids  Follow a low fat diet  COVID-19  No residual symptoms  BMI Counseling: Body mass index is 31 19 kg/m²  The BMI is above normal  Nutrition recommendations include reducing portion sizes, decreasing overall calorie intake, 3-5 servings of fruits/vegetables daily and moderation in carbohydrate intake  Exercise recommendations include exercising 3-5 times per week  Diagnoses and all orders for this visit:    Benign essential hypertension    Mild intermittent asthma without complication    Mixed hyperlipidemia  -     Comprehensive metabolic panel; Future  -     Lipid Panel with Direct LDL reflex; Future    Rash  Comments:  area is mostly healed, likely dermatitis  moisturize daily with cera ve  Irritable bowel syndrome with diarrhea  -     CBC and differential; Future    COVID-19    Class 1 obesity due to excess calories without serious comorbidity with body mass index (BMI) of 31 0 to 31 9 in adult  -     TSH, 3rd generation with Free T4 reflex; Future          Subjective:      Patient ID: Yanna Davis is a 39 y o  female  Here today for follow up  Jace Cortes is doing well  She has fully recovered from [place]  She does report starting with a rash on her back while she had COVID  It was itchy at the time  The itching has improved  She reports swelling in her ankles on 10 mg of amlodipine  She has been taking 5 mg recently  She hasn't been checking her blood pressure at home                   The following portions of the patient's history were reviewed and updated as appropriate: allergies, current medications, past family history, past medical history, past social history, past surgical history and problem list     Review of Systems   Constitutional: Negative for activity change, appetite change, fatigue and unexpected weight change  Eyes: Negative for visual disturbance  Respiratory: Negative for cough, chest tightness, shortness of breath and wheezing  Cardiovascular: Negative for chest pain, palpitations and leg swelling  Gastrointestinal: Negative for abdominal pain, constipation and diarrhea  Genitourinary: Negative for difficulty urinating  Musculoskeletal: Negative for arthralgias  Skin: Positive for rash  Neurological: Negative for dizziness, weakness, light-headedness and headaches  Psychiatric/Behavioral: Negative for sleep disturbance  Objective:      /82   Pulse 82   Temp (!) 97 1 °F (36 2 °C)   Ht 5' 9" (1 753 m)   Wt 95 8 kg (211 lb 3 2 oz)   LMP 06/16/2016   SpO2 98%   BMI 31 19 kg/m²          Physical Exam  Vitals signs reviewed  Constitutional:       Appearance: She is well-developed  HENT:      Head: Normocephalic and atraumatic  Eyes:      Conjunctiva/sclera: Conjunctivae normal       Pupils: Pupils are equal, round, and reactive to light  Cardiovascular:      Rate and Rhythm: Normal rate and regular rhythm  Heart sounds: Normal heart sounds  Pulmonary:      Effort: Pulmonary effort is normal       Breath sounds: Normal breath sounds  Abdominal:      General: Bowel sounds are normal       Palpations: Abdomen is soft  Musculoskeletal: Normal range of motion  General: No swelling  Skin:     General: Skin is warm and dry  Findings: Rash present  Comments: Mildly erythematous papules on mid back  Neurological:      Mental Status: She is alert and oriented to person, place, and time     Psychiatric:         Behavior: Behavior normal

## 2021-03-18 NOTE — ASSESSMENT & PLAN NOTE
Blood pressure is acceptable  Continue current dose of amlodipine  Monitor blood pressure at home and call the office in 1-2 weeks with results     Follow a low sodium diet

## 2021-03-30 DIAGNOSIS — Z23 ENCOUNTER FOR IMMUNIZATION: ICD-10-CM

## 2021-03-31 DIAGNOSIS — I10 BENIGN ESSENTIAL HYPERTENSION: Primary | ICD-10-CM

## 2021-03-31 RX ORDER — AMLODIPINE BESYLATE 5 MG/1
TABLET ORAL
Qty: 90 TABLET | Refills: 0 | Status: SHIPPED | OUTPATIENT
Start: 2021-03-31 | End: 2021-06-29

## 2021-04-02 ENCOUNTER — IMMUNIZATIONS (OUTPATIENT)
Dept: FAMILY MEDICINE CLINIC | Facility: HOSPITAL | Age: 46
End: 2021-04-02

## 2021-04-02 DIAGNOSIS — Z23 ENCOUNTER FOR IMMUNIZATION: Primary | ICD-10-CM

## 2021-04-02 PROCEDURE — 0001A SARS-COV-2 / COVID-19 MRNA VACCINE (PFIZER-BIONTECH) 30 MCG: CPT

## 2021-04-02 PROCEDURE — 91300 SARS-COV-2 / COVID-19 MRNA VACCINE (PFIZER-BIONTECH) 30 MCG: CPT

## 2021-04-24 ENCOUNTER — IMMUNIZATIONS (OUTPATIENT)
Dept: FAMILY MEDICINE CLINIC | Facility: HOSPITAL | Age: 46
End: 2021-04-24

## 2021-04-24 DIAGNOSIS — Z23 ENCOUNTER FOR IMMUNIZATION: Primary | ICD-10-CM

## 2021-04-24 PROCEDURE — 91300 SARS-COV-2 / COVID-19 MRNA VACCINE (PFIZER-BIONTECH) 30 MCG: CPT

## 2021-04-24 PROCEDURE — 0002A SARS-COV-2 / COVID-19 MRNA VACCINE (PFIZER-BIONTECH) 30 MCG: CPT

## 2021-06-29 DIAGNOSIS — I10 BENIGN ESSENTIAL HYPERTENSION: ICD-10-CM

## 2021-06-29 RX ORDER — AMLODIPINE BESYLATE 5 MG/1
TABLET ORAL
Qty: 90 TABLET | Refills: 0 | Status: SHIPPED | OUTPATIENT
Start: 2021-06-29 | End: 2021-09-27

## 2021-07-12 ENCOUNTER — OFFICE VISIT (OUTPATIENT)
Dept: INTERNAL MEDICINE CLINIC | Facility: CLINIC | Age: 46
End: 2021-07-12
Payer: COMMERCIAL

## 2021-07-12 VITALS
HEIGHT: 69 IN | WEIGHT: 217.2 LBS | OXYGEN SATURATION: 98 % | BODY MASS INDEX: 32.17 KG/M2 | TEMPERATURE: 97.6 F | HEART RATE: 88 BPM | SYSTOLIC BLOOD PRESSURE: 122 MMHG | DIASTOLIC BLOOD PRESSURE: 86 MMHG

## 2021-07-12 DIAGNOSIS — H60.331 ACUTE SWIMMER'S EAR OF RIGHT SIDE: Primary | ICD-10-CM

## 2021-07-12 DIAGNOSIS — R07.9 RIGHT-SIDED CHEST PAIN: ICD-10-CM

## 2021-07-12 PROCEDURE — 1036F TOBACCO NON-USER: CPT | Performed by: NURSE PRACTITIONER

## 2021-07-12 PROCEDURE — 3008F BODY MASS INDEX DOCD: CPT | Performed by: NURSE PRACTITIONER

## 2021-07-12 PROCEDURE — 99213 OFFICE O/P EST LOW 20 MIN: CPT | Performed by: NURSE PRACTITIONER

## 2021-07-12 PROCEDURE — 3079F DIAST BP 80-89 MM HG: CPT | Performed by: NURSE PRACTITIONER

## 2021-07-12 PROCEDURE — 3074F SYST BP LT 130 MM HG: CPT | Performed by: NURSE PRACTITIONER

## 2021-07-12 RX ORDER — HYDROCORTISONE AND ACETIC ACID 20.75; 10.375 MG/ML; MG/ML
3 SOLUTION AURICULAR (OTIC) EVERY 6 HOURS SCHEDULED
Qty: 10 ML | Refills: 0 | Status: SHIPPED | OUTPATIENT
Start: 2021-07-12

## 2021-07-12 NOTE — PROGRESS NOTES
Assessment/Plan:    Ear drops every 6 hours for 5-7 days  Recommend ENT if not improving over the next week  Diagnoses and all orders for this visit:    Acute swimmer's ear of right side  -     hydrocortisone-acetic acid (VOSOL-HC) otic solution; Administer 3 drops to the right ear every 6 (six) hours    Right-sided chest pain  -     XR chest pa & lateral; Future          Subjective:      Patient ID: Ana Mendoza is a 39 y o  female  Here today with complaints of right ear pain   Started 2 weeks ago  She thought it was swimmers ear  She used OTC drops for a few days  It has improved but still having some discomfort  She also had a lump behind that ear which has resolved but the area is   Saturday she had a coughing episode  Since then she hasn't had any cough but has had right chest and back pain at the end of expiration  She denies any fever or chills  No wheezing or shortness of breath  She has a runny nose which she has had from her allergies  The following portions of the patient's history were reviewed and updated as appropriate: allergies, current medications, past family history, past medical history, past social history, past surgical history and problem list     Review of Systems   Constitutional: Negative for activity change, appetite change, chills, fatigue and fever  HENT: Positive for ear pain  Negative for congestion, facial swelling, hearing loss, postnasal drip, rhinorrhea and sore throat  Eyes: Negative for visual disturbance  Respiratory: Positive for cough  Negative for chest tightness, shortness of breath and wheezing  Cardiovascular: Positive for chest pain  Negative for palpitations and leg swelling  Gastrointestinal: Negative for abdominal pain, diarrhea, nausea and vomiting  Genitourinary: Negative for difficulty urinating  Musculoskeletal: Negative for myalgias  Skin: Negative for color change and rash     Neurological: Negative for dizziness, light-headedness and headaches  Objective:      /86   Pulse 88   Temp 97 6 °F (36 4 °C)   Ht 5' 9" (1 753 m)   Wt 98 5 kg (217 lb 3 2 oz)   LMP 06/16/2016   SpO2 98%   BMI 32 07 kg/m²          Physical Exam  Vitals reviewed  Constitutional:       Appearance: Normal appearance  HENT:      Head: Normocephalic and atraumatic  Right Ear: Swelling and tenderness present  No drainage  No middle ear effusion  There is no impacted cerumen  Tympanic membrane is not injected, perforated, erythematous, retracted or bulging  Left Ear: Tympanic membrane, ear canal and external ear normal       Ears:      Comments: Mild swelling and erythema of the right ear canal    Eyes:      Conjunctiva/sclera: Conjunctivae normal    Cardiovascular:      Rate and Rhythm: Normal rate and regular rhythm  Heart sounds: Normal heart sounds  Pulmonary:      Effort: Pulmonary effort is normal       Breath sounds: Examination of the right-lower field reveals decreased breath sounds  Decreased breath sounds present  No wheezing  Musculoskeletal:      Cervical back: Neck supple  Lymphadenopathy:      Cervical: No cervical adenopathy  Skin:     General: Skin is warm and dry  Findings: No rash  Neurological:      Mental Status: She is alert and oriented to person, place, and time     Psychiatric:         Mood and Affect: Mood normal          Behavior: Behavior normal

## 2021-09-21 ENCOUNTER — RA CDI HCC (OUTPATIENT)
Dept: OTHER | Facility: HOSPITAL | Age: 46
End: 2021-09-21

## 2021-09-21 NOTE — PROGRESS NOTES
[facility]a [facility]  coding opportunities       Chart reviewed, no opportunity found: CHART REVIEWED, NO OPPORTUNITY FOUND                        Patients insurance company: [employer] (Medicare Advantage and Commercial)

## 2021-09-27 DIAGNOSIS — I10 BENIGN ESSENTIAL HYPERTENSION: ICD-10-CM

## 2021-09-27 RX ORDER — AMLODIPINE BESYLATE 5 MG/1
TABLET ORAL
Qty: 90 TABLET | Refills: 0 | Status: SHIPPED | OUTPATIENT
Start: 2021-09-27 | End: 2021-12-30

## 2021-09-28 ENCOUNTER — OFFICE VISIT (OUTPATIENT)
Dept: INTERNAL MEDICINE CLINIC | Facility: CLINIC | Age: 46
End: 2021-09-28
Payer: COMMERCIAL

## 2021-09-28 VITALS
BODY MASS INDEX: 32.14 KG/M2 | WEIGHT: 217 LBS | SYSTOLIC BLOOD PRESSURE: 124 MMHG | TEMPERATURE: 97.7 F | OXYGEN SATURATION: 98 % | DIASTOLIC BLOOD PRESSURE: 80 MMHG | HEART RATE: 98 BPM | HEIGHT: 69 IN

## 2021-09-28 DIAGNOSIS — K21.9 GASTROESOPHAGEAL REFLUX DISEASE WITHOUT ESOPHAGITIS: Primary | ICD-10-CM

## 2021-09-28 DIAGNOSIS — K58.0 IRRITABLE BOWEL SYNDROME WITH DIARRHEA: ICD-10-CM

## 2021-09-28 DIAGNOSIS — Z23 ENCOUNTER FOR IMMUNIZATION: ICD-10-CM

## 2021-09-28 DIAGNOSIS — I10 BENIGN ESSENTIAL HYPERTENSION: ICD-10-CM

## 2021-09-28 DIAGNOSIS — Z12.31 ENCOUNTER FOR SCREENING MAMMOGRAM FOR BREAST CANCER: ICD-10-CM

## 2021-09-28 DIAGNOSIS — E78.2 MIXED HYPERLIPIDEMIA: ICD-10-CM

## 2021-09-28 DIAGNOSIS — J45.20 MILD INTERMITTENT ASTHMA WITHOUT COMPLICATION: ICD-10-CM

## 2021-09-28 PROCEDURE — 3008F BODY MASS INDEX DOCD: CPT | Performed by: NURSE PRACTITIONER

## 2021-09-28 PROCEDURE — 99214 OFFICE O/P EST MOD 30 MIN: CPT | Performed by: NURSE PRACTITIONER

## 2021-09-28 PROCEDURE — 90471 IMMUNIZATION ADMIN: CPT | Performed by: NURSE PRACTITIONER

## 2021-09-28 PROCEDURE — 1036F TOBACCO NON-USER: CPT | Performed by: NURSE PRACTITIONER

## 2021-09-28 PROCEDURE — 90682 RIV4 VACC RECOMBINANT DNA IM: CPT | Performed by: NURSE PRACTITIONER

## 2021-09-28 NOTE — ASSESSMENT & PLAN NOTE
Start nexium daily for 30 days  Follow a low acidic diet   Avoid eating 2 hours prior to bedtime  Don't lay down for at least 1 hour after meals  Call if symptoms persist, may need GI evaluation

## 2021-09-28 NOTE — PROGRESS NOTES
Assessment/Plan:    GERD (gastroesophageal reflux disease)  Start nexium daily for 30 days  Follow a low acidic diet   Avoid eating 2 hours prior to bedtime  Don't lay down for at least 1 hour after meals  Call if symptoms persist, may need GI evaluation  Irritable bowel syndrome with diarrhea  Stable  Continue questran daily  Asthma  Stable  On singulair daily  Uses albuterol as needed  Sees an allergist      Benign essential hypertension  Stable  Continue amlodipine  Hyperlipidemia  Overdue for lipids  Diagnoses and all orders for this visit:    Gastroesophageal reflux disease without esophagitis    Benign essential hypertension    Irritable bowel syndrome with diarrhea    Mild intermittent asthma without complication    Mixed hyperlipidemia    Encounter for immunization  -     Cancel: influenza vaccine, quadrivalent, 0 5 mL, preservative-free, for adult and pediatric patients 6 mos+ (AFLURIA, FLUARIX, FLULAVAL, FLUZONE)  -     influenza vaccine, quadrivalent, recombinant, PF, 0 5 mL, for patients 18 yr+ (FLUBLOK)    Encounter for screening mammogram for breast cancer  -     Mammo screening bilateral w cad; Future          Subjective:      Patient ID: Marisabel Kimball is a 55 y o  female  Here today for follow up   [place] is doing ok  About 2 weeks ago she ate a late dinner prior to bed  She woke up at 2 am choking on her reflux  She felt like she couldn't breath for a second  Since then she has had increased reflux symptoms  She feels like food gets stuck in her lower esophagus  She feels easily bloated and is belching more  She denies any abdominal pain, nausea, vomiting, or diarrhea  She has not been checking her blood pressure at home  She is compliant with her medications               The following portions of the patient's history were reviewed and updated as appropriate: allergies, current medications, past family history, past medical history, past social history, past surgical history and problem list     Review of Systems   Constitutional: Negative for activity change, appetite change, fatigue and unexpected weight change  Eyes: Negative for visual disturbance  Respiratory: Negative for cough, chest tightness, shortness of breath and wheezing  Cardiovascular: Negative for chest pain, palpitations and leg swelling  Gastrointestinal: Negative for abdominal pain, blood in stool, constipation, diarrhea, nausea and vomiting  Increase heartburn    Genitourinary: Negative for difficulty urinating  Musculoskeletal: Negative for arthralgias  Skin: Negative for rash  Neurological: Negative for dizziness, weakness, light-headedness and headaches  Psychiatric/Behavioral: Negative for sleep disturbance  Objective:      /80   Pulse 98   Temp 97 7 °F (36 5 °C)   Ht 5' 9" (1 753 m)   Wt 98 4 kg (217 lb)   LMP 06/16/2016   SpO2 98%   BMI 32 05 kg/m²          Physical Exam  Vitals reviewed  Constitutional:       Appearance: She is well-developed  HENT:      Head: Normocephalic and atraumatic  Eyes:      Conjunctiva/sclera: Conjunctivae normal       Pupils: Pupils are equal, round, and reactive to light  Neck:      Thyroid: No thyromegaly  Cardiovascular:      Rate and Rhythm: Normal rate and regular rhythm  Heart sounds: Normal heart sounds  Pulmonary:      Effort: Pulmonary effort is normal       Breath sounds: Normal breath sounds  Abdominal:      General: Bowel sounds are normal       Palpations: Abdomen is soft  Tenderness: There is no abdominal tenderness  Musculoskeletal:         General: Normal range of motion  Right lower leg: No edema  Left lower leg: No edema  Lymphadenopathy:      Cervical: No cervical adenopathy  Skin:     General: Skin is warm and dry  Neurological:      Mental Status: She is alert and oriented to person, place, and time     Psychiatric:         Behavior: Behavior normal

## 2021-12-30 DIAGNOSIS — I10 BENIGN ESSENTIAL HYPERTENSION: ICD-10-CM

## 2021-12-30 RX ORDER — AMLODIPINE BESYLATE 5 MG/1
TABLET ORAL
Qty: 90 TABLET | Refills: 0 | Status: SHIPPED | OUTPATIENT
Start: 2021-12-30 | End: 2022-03-30

## 2022-03-29 ENCOUNTER — OFFICE VISIT (OUTPATIENT)
Dept: INTERNAL MEDICINE CLINIC | Facility: CLINIC | Age: 47
End: 2022-03-29
Payer: COMMERCIAL

## 2022-03-29 VITALS
HEART RATE: 87 BPM | TEMPERATURE: 96.9 F | SYSTOLIC BLOOD PRESSURE: 132 MMHG | HEIGHT: 69 IN | OXYGEN SATURATION: 98 % | DIASTOLIC BLOOD PRESSURE: 82 MMHG | WEIGHT: 223 LBS | BODY MASS INDEX: 33.03 KG/M2

## 2022-03-29 DIAGNOSIS — K21.9 GASTROESOPHAGEAL REFLUX DISEASE WITHOUT ESOPHAGITIS: ICD-10-CM

## 2022-03-29 DIAGNOSIS — J45.20 MILD INTERMITTENT ASTHMA WITHOUT COMPLICATION: ICD-10-CM

## 2022-03-29 DIAGNOSIS — K59.1 FUNCTIONAL DIARRHEA: Primary | ICD-10-CM

## 2022-03-29 DIAGNOSIS — I10 BENIGN ESSENTIAL HYPERTENSION: ICD-10-CM

## 2022-03-29 DIAGNOSIS — R00.2 PALPITATIONS: ICD-10-CM

## 2022-03-29 PROCEDURE — 99214 OFFICE O/P EST MOD 30 MIN: CPT | Performed by: NURSE PRACTITIONER

## 2022-03-29 PROCEDURE — 3008F BODY MASS INDEX DOCD: CPT | Performed by: NURSE PRACTITIONER

## 2022-03-29 PROCEDURE — 3725F SCREEN DEPRESSION PERFORMED: CPT | Performed by: NURSE PRACTITIONER

## 2022-03-29 PROCEDURE — 1036F TOBACCO NON-USER: CPT | Performed by: NURSE PRACTITIONER

## 2022-03-29 NOTE — PROGRESS NOTES
Assessment/Plan:    GERD (gastroesophageal reflux disease)  No recent symptoms  Functional diarrhea  Stable  On questran daily  Asthma  Stable  Takes singulair daily  Albuterol PRN  Recently saw her allergist      Benign essential hypertension  Stable  On amlodipine  Palpitations  No symptoms the last 2 days after increasing her fluid intake  Continue to monitor for symptoms  If symptoms reoccur will check EKG/holter monitor  Check labs  Limit caffeine, maintain good hydration, practice good sleep hygiene, and regular exercise  BMI Counseling: Body mass index is 32 93 kg/m²  The BMI is above normal  Nutrition recommendations include reducing portion sizes, decreasing overall calorie intake, decreasing soda and/or juice intake, moderation in carbohydrate intake and increasing intake of lean protein  Exercise recommendations include exercising 3-5 times per week  Diagnoses and all orders for this visit:    Functional diarrhea    Gastroesophageal reflux disease without esophagitis    Mild intermittent asthma without complication    Benign essential hypertension  -     Lipid Panel with Direct LDL reflex; Future    Palpitations  -     Comprehensive metabolic panel; Future  -     CBC and differential; Future  -     TSH, 3rd generation with Free T4 reflex; Future          Subjective:      Patient ID: Hussain Norris is a 55 y o  female  Here today for follow up  Kay Garcia is doing well  She saw her allergist in November  Breathing has been stable  She started back at the gym recently  She is trying to start eating better  She does not check her blood pressure at home  About 4 days ago she started having palpitations  She had this about 5-10 years ago  She eliminated caffeine and felt better  She has already eliminated caffeine  She read that dehydration may cause them so she started drinking more water the last few days and hasn't had any palpitations   She denies any lightheadedness, dizziness, chest pain, or shortness of breath  Episodes were lasting <30 seconds  The following portions of the patient's history were reviewed and updated as appropriate: allergies, current medications, past family history, past medical history, past social history, past surgical history and problem list     Review of Systems   Constitutional: Negative for activity change, appetite change, fatigue and unexpected weight change  Eyes: Negative for visual disturbance  Respiratory: Negative for cough, chest tightness, shortness of breath and wheezing  Cardiovascular: Positive for palpitations  Negative for chest pain and leg swelling  Gastrointestinal: Negative for abdominal pain, blood in stool, constipation and diarrhea  Genitourinary: Negative for difficulty urinating  Musculoskeletal: Negative for arthralgias  Skin: Negative for rash  Neurological: Negative for dizziness, weakness, light-headedness and headaches  Psychiatric/Behavioral: Negative for sleep disturbance  Objective:      /82   Pulse 87   Temp (!) 96 9 °F (36 1 °C)   Ht 5' 9" (1 753 m)   Wt 101 kg (223 lb)   LMP 06/16/2016   SpO2 98%   BMI 32 93 kg/m²          Physical Exam  Vitals reviewed  Constitutional:       Appearance: She is well-developed  HENT:      Head: Normocephalic and atraumatic  Eyes:      Conjunctiva/sclera: Conjunctivae normal       Pupils: Pupils are equal, round, and reactive to light  Neck:      Thyroid: No thyromegaly  Cardiovascular:      Rate and Rhythm: Normal rate and regular rhythm  Heart sounds: Normal heart sounds  Pulmonary:      Effort: Pulmonary effort is normal       Breath sounds: Normal breath sounds  Abdominal:      General: Bowel sounds are normal       Palpations: Abdomen is soft  Musculoskeletal:         General: Normal range of motion  Lymphadenopathy:      Cervical: No cervical adenopathy     Skin:     General: Skin is warm and dry  Neurological:      Mental Status: She is alert and oriented to person, place, and time     Psychiatric:         Behavior: Behavior normal

## 2022-03-29 NOTE — ASSESSMENT & PLAN NOTE
No symptoms the last 2 days after increasing her fluid intake  Continue to monitor for symptoms  If symptoms reoccur will check EKG/holter monitor  Check labs  Limit caffeine, maintain good hydration, practice good sleep hygiene, and regular exercise

## 2022-03-30 DIAGNOSIS — I10 BENIGN ESSENTIAL HYPERTENSION: ICD-10-CM

## 2022-03-30 RX ORDER — AMLODIPINE BESYLATE 5 MG/1
TABLET ORAL
Qty: 90 TABLET | Refills: 0 | Status: SHIPPED | OUTPATIENT
Start: 2022-03-30 | End: 2022-06-28

## 2022-05-05 ENCOUNTER — APPOINTMENT (OUTPATIENT)
Dept: LAB | Facility: MEDICAL CENTER | Age: 47
End: 2022-05-05
Payer: COMMERCIAL

## 2022-05-05 ENCOUNTER — OFFICE VISIT (OUTPATIENT)
Dept: OBGYN CLINIC | Facility: MEDICAL CENTER | Age: 47
End: 2022-05-05
Payer: COMMERCIAL

## 2022-05-05 VITALS
WEIGHT: 226 LBS | BODY MASS INDEX: 33.47 KG/M2 | DIASTOLIC BLOOD PRESSURE: 82 MMHG | HEIGHT: 69 IN | HEART RATE: 87 BPM | SYSTOLIC BLOOD PRESSURE: 112 MMHG

## 2022-05-05 DIAGNOSIS — Z01.89 ENCOUNTER FOR LOWER EXTREMITY COMPARISON IMAGING STUDY: ICD-10-CM

## 2022-05-05 DIAGNOSIS — M17.12 ARTHRITIS OF LEFT KNEE: Primary | ICD-10-CM

## 2022-05-05 DIAGNOSIS — R00.2 PALPITATIONS: ICD-10-CM

## 2022-05-05 DIAGNOSIS — I10 BENIGN ESSENTIAL HYPERTENSION: ICD-10-CM

## 2022-05-05 LAB
ALBUMIN SERPL BCP-MCNC: 3.7 G/DL (ref 3.5–5)
ALP SERPL-CCNC: 66 U/L (ref 46–116)
ALT SERPL W P-5'-P-CCNC: 42 U/L (ref 12–78)
ANION GAP SERPL CALCULATED.3IONS-SCNC: 7 MMOL/L (ref 4–13)
AST SERPL W P-5'-P-CCNC: 25 U/L (ref 5–45)
BASOPHILS # BLD AUTO: 0.05 THOUSANDS/ΜL (ref 0–0.1)
BASOPHILS NFR BLD AUTO: 1 % (ref 0–1)
BILIRUB SERPL-MCNC: 0.66 MG/DL (ref 0.2–1)
BUN SERPL-MCNC: 9 MG/DL (ref 5–25)
CALCIUM SERPL-MCNC: 9.1 MG/DL (ref 8.3–10.1)
CHLORIDE SERPL-SCNC: 105 MMOL/L (ref 100–108)
CHOLEST SERPL-MCNC: 205 MG/DL
CO2 SERPL-SCNC: 25 MMOL/L (ref 21–32)
CREAT SERPL-MCNC: 0.95 MG/DL (ref 0.6–1.3)
EOSINOPHIL # BLD AUTO: 0.09 THOUSAND/ΜL (ref 0–0.61)
EOSINOPHIL NFR BLD AUTO: 1 % (ref 0–6)
ERYTHROCYTE [DISTWIDTH] IN BLOOD BY AUTOMATED COUNT: 12.4 % (ref 11.6–15.1)
GFR SERPL CREATININE-BSD FRML MDRD: 72 ML/MIN/1.73SQ M
GLUCOSE P FAST SERPL-MCNC: 90 MG/DL (ref 65–99)
HCT VFR BLD AUTO: 42.1 % (ref 34.8–46.1)
HDLC SERPL-MCNC: 55 MG/DL
HGB BLD-MCNC: 14.2 G/DL (ref 11.5–15.4)
IMM GRANULOCYTES # BLD AUTO: 0.04 THOUSAND/UL (ref 0–0.2)
IMM GRANULOCYTES NFR BLD AUTO: 1 % (ref 0–2)
LDLC SERPL CALC-MCNC: 117 MG/DL (ref 0–100)
LYMPHOCYTES # BLD AUTO: 2.53 THOUSANDS/ΜL (ref 0.6–4.47)
LYMPHOCYTES NFR BLD AUTO: 31 % (ref 14–44)
MCH RBC QN AUTO: 29.7 PG (ref 26.8–34.3)
MCHC RBC AUTO-ENTMCNC: 33.7 G/DL (ref 31.4–37.4)
MCV RBC AUTO: 88 FL (ref 82–98)
MONOCYTES # BLD AUTO: 0.48 THOUSAND/ΜL (ref 0.17–1.22)
MONOCYTES NFR BLD AUTO: 6 % (ref 4–12)
NEUTROPHILS # BLD AUTO: 5 THOUSANDS/ΜL (ref 1.85–7.62)
NEUTS SEG NFR BLD AUTO: 60 % (ref 43–75)
NRBC BLD AUTO-RTO: 0 /100 WBCS
PLATELET # BLD AUTO: 305 THOUSANDS/UL (ref 149–390)
PMV BLD AUTO: 9.9 FL (ref 8.9–12.7)
POTASSIUM SERPL-SCNC: 4.1 MMOL/L (ref 3.5–5.3)
PROT SERPL-MCNC: 7.7 G/DL (ref 6.4–8.2)
RBC # BLD AUTO: 4.78 MILLION/UL (ref 3.81–5.12)
SODIUM SERPL-SCNC: 137 MMOL/L (ref 136–145)
TRIGL SERPL-MCNC: 164 MG/DL
TSH SERPL DL<=0.05 MIU/L-ACNC: 2.07 UIU/ML (ref 0.45–4.5)
WBC # BLD AUTO: 8.19 THOUSAND/UL (ref 4.31–10.16)

## 2022-05-05 PROCEDURE — 84443 ASSAY THYROID STIM HORMONE: CPT

## 2022-05-05 PROCEDURE — 1036F TOBACCO NON-USER: CPT | Performed by: ORTHOPAEDIC SURGERY

## 2022-05-05 PROCEDURE — 3008F BODY MASS INDEX DOCD: CPT | Performed by: ORTHOPAEDIC SURGERY

## 2022-05-05 PROCEDURE — 36415 COLL VENOUS BLD VENIPUNCTURE: CPT

## 2022-05-05 PROCEDURE — 80061 LIPID PANEL: CPT

## 2022-05-05 PROCEDURE — 99204 OFFICE O/P NEW MOD 45 MIN: CPT | Performed by: ORTHOPAEDIC SURGERY

## 2022-05-05 PROCEDURE — 85025 COMPLETE CBC W/AUTO DIFF WBC: CPT

## 2022-05-05 PROCEDURE — 80053 COMPREHEN METABOLIC PANEL: CPT

## 2022-05-05 NOTE — PROGRESS NOTES
Assessment/Plan     1  Arthritis of left knee    2  Encounter for lower extremity comparison imaging study      Orders Placed This Encounter   Procedures    XR knee 4+ vw left injury    XR knee 1 or 2 vw right    Ambulatory Referral to Physical Therapy     - Meera Vaca presents with mild medial and patellofemoral compartment osteoarthritis  - There is a suspicion for a meniscus tear, and conservative treatment options such as physical therapy, steroid injection, and bracing were discussed  A physical therapy script has been placed  - Continue with activities as tolerated and Advil OTC bid  - A steroid injection was denied at this visit, but will be considered at the next visit  An MRI will be considered as well if symptoms worse or fail to improve  Return in about 6 weeks (around 6/16/2022) for Recheck  I answered all of the patient's questions during the visit and provided education of the patient's condition during the visit  The patient verbalized understanding of the information given and agrees with the plan  This note was dictated using [email or web address] software  It may contain errors including improperly dictated words  Please contact physician directly for any questions  History of Present Illness   Chief complaint:   Chief Complaint   Patient presents with    Left Knee - Pain       HPI: Mirela Jade is a 55 y o  female that c/o left knee pain  She has been experiencing sharp and dull knee pain located anterolaterally for about 2 weeks, and reports she has always had knee issues since running track in high school with no diagnosed injuries  She favors the right knee when standing up from a kneeling position due to distrust of her left knee, and describes weakness compared to the contralateral side  Symptoms include clicking, popping, swelling, and occasional locking of her knee  Meera Vaca has attempted rest, Advil OTC bid with some relief, especially with swelling   She is currently taking Norvasc to treat hypertension  ROS:    See HPI for musculoskeletal review     All other systems reviewed are negative     Historical Information   Past Medical History:   Diagnosis Date    Anxiety     Asthma     Blood pressure elevated without history of HTN     Last Assessed:6/30/2012    Depression     GERD (gastroesophageal reflux disease)     History of herpes simplex infection     History of hypertension     History of palpitations     Hyperlipidemia     IBS (irritable bowel syndrome)     Microscopic hematuria     Last Assessed:6/10/2014    Obesity     Seasonal allergies     Superficial thrombophlebitis of leg     Vitamin D deficiency      Past Surgical History:   Procedure Laterality Date    COLONOSCOPY      Last Assessed:7/9/2012    DENTAL SURGERY      WA BREAST AUGMENTATION WITH IMPLANT Bilateral 6/29/2016    Procedure: AUGMENTATION BREAST;  Surgeon: Migue Acevedo MD;  Location:  MAIN OR;  Service: Plastics    WA LAP,VAG HYST,UTERUS 250GMS/< N/A 6/30/2017    Procedure: LAPAROSCOPIC ASSISTED VAGINAL HYSTERECTOMY AND BILATERAL REMOVAL OF Jj Razor;  Surgeon: Carri Gates MD;  Location:  MAIN OR;  Service: Gynecology     Social History   Social History     Substance and Sexual Activity   Alcohol Use Yes    Comment: sovial use of beer and wine     Social History     Substance and Sexual Activity   Drug Use No     Social History     Tobacco Use   Smoking Status Never Smoker   Smokeless Tobacco Never Used     Family History:   Family History   Problem Relation Age of Onset    Heart attack Father         52's    Colon cancer Father     Coronary artery disease Father     Diabetes Father     Other Father         kidney transplant 6/2018    Cancer Father         Brain Cancer     Heart attack Family     Anxiety disorder Other     Rectal cancer Maternal Grandfather     Alcohol abuse Neg Hx     Substance Abuse Neg Hx     Mental illness Neg Hx     Depression Neg Hx Current Outpatient Medications on File Prior to Visit   Medication Sig Dispense Refill    albuterol (PROVENTIL HFA,VENTOLIN HFA) 90 mcg/act inhaler Inhale 2 puffs every 6 (six) hours as needed for wheezing   amLODIPine (NORVASC) 5 mg tablet TAKE 1 TABLET(5 MG) BY MOUTH DAILY 90 tablet 0    Blood Pressure KIT by Does not apply route 2 (two) times a week 1 each 0    Cholecalciferol (Vitamin D) 50 MCG (2000 UT) CAPS every 24 hours      cholestyramine (QUESTRAN) 4 g packet USE 1 PACKET AT BEDTIME AS NEEDED FOR LOOSE BOWEL MOVEMENTS 90 each 3    hydrocortisone-acetic acid (VOSOL-HC) otic solution Administer 3 drops to the right ear every 6 (six) hours 10 mL 0    loratadine (CLARITIN) 10 mg tablet Take 10 mg by mouth daily      montelukast (SINGULAIR) 10 mg tablet Take 10 mg by mouth daily at bedtime  Pt not dure of dose, advised to bring list am of surgery      Multiple Vitamin (MULTIVITAMIN) capsule Take 1 capsule by mouth daily      valACYclovir (VALTREX) 1,000 mg tablet Take 1 tablet (1,000 mg total) by mouth 2 (two) times a day as needed (cold sores) for up to 2 days 20 tablet 0     No current facility-administered medications on file prior to visit  Allergies   Allergen Reactions    Erythromycin      Other reaction(s): GI Upset       Current Outpatient Medications on File Prior to Visit   Medication Sig Dispense Refill    albuterol (PROVENTIL HFA,VENTOLIN HFA) 90 mcg/act inhaler Inhale 2 puffs every 6 (six) hours as needed for wheezing        amLODIPine (NORVASC) 5 mg tablet TAKE 1 TABLET(5 MG) BY MOUTH DAILY 90 tablet 0    Blood Pressure KIT by Does not apply route 2 (two) times a week 1 each 0    Cholecalciferol (Vitamin D) 50 MCG (2000 UT) CAPS every 24 hours      cholestyramine (QUESTRAN) 4 g packet USE 1 PACKET AT BEDTIME AS NEEDED FOR LOOSE BOWEL MOVEMENTS 90 each 3    hydrocortisone-acetic acid (VOSOL-HC) otic solution Administer 3 drops to the right ear every 6 (six) hours 10 mL 0    loratadine (CLARITIN) 10 mg tablet Take 10 mg by mouth daily      montelukast (SINGULAIR) 10 mg tablet Take 10 mg by mouth daily at bedtime  Pt not dure of dose, advised to bring list am of surgery      Multiple Vitamin (MULTIVITAMIN) capsule Take 1 capsule by mouth daily      valACYclovir (VALTREX) 1,000 mg tablet Take 1 tablet (1,000 mg total) by mouth 2 (two) times a day as needed (cold sores) for up to 2 days 20 tablet 0     No current facility-administered medications on file prior to visit  Objective   Vitals: Blood pressure 112/82, pulse 87, height 5' 9" (1 753 m), weight 103 kg (226 lb), last menstrual period 06/16/2016 ,Body mass index is 33 37 kg/m²      PE:  AAOx 3  WDWN  Hearing intact, no drainage from eyes  Regular rate  no audible wheezing  no abdominal distension  LE compartments soft, skin intact    leftknee:    Appearance:  Generalized swelling   No ecchymosis  no obvious joint deformity   Mild effusion  Anterior scar over patellar tendon  Palpation/Tenderness:  +TTP over medial joint line  + TTP over lateral joint line   No TTP over patella  No TTP over patellar tendon  + mild TTP over pes anserine bursa  Active Range of Motion:  AROM: full (active 0-120, passive 0-130)  Special Tests:  Medial Bao's Test:  Positive  Lateral Bao's Test:  Positive  Apley's compression test:  Negative  Lachman's Test:  negative  Anterior Drawer Test:  Negative  Patellar grind:  Positive  Valgus Stress Test:  negative  Varus Stress Test:  negative     No ipsilateral hip pain with ROM    leftLE:    Sensation grossly intact  Palpable pulse  AT/GS/EHL intact    LLE:  EHL/AT/GS/quads/hamstrings/iliopsoas 5/5, sensation grossly intact L4, L5, S1, palpable pedal pulse    Imaging Studies: I have personally reviewed pertinent films in PACS  XR leftknee:    Mild arthritis as evidenced by mild joint space narrowing

## 2022-05-12 ENCOUNTER — EVALUATION (OUTPATIENT)
Dept: PHYSICAL THERAPY | Facility: REHABILITATION | Age: 47
End: 2022-05-12
Payer: COMMERCIAL

## 2022-05-12 DIAGNOSIS — M17.12 ARTHRITIS OF LEFT KNEE: Primary | ICD-10-CM

## 2022-05-12 PROCEDURE — 97161 PT EVAL LOW COMPLEX 20 MIN: CPT | Performed by: PHYSICAL MEDICINE & REHABILITATION

## 2022-05-12 NOTE — LETTER
May 12, 2022    Soren Diaz DO  [facility]  [address]    Patient: Billie Khalil   YOB: 1975   Date of Visit: 2022     Encounter Diagnosis     ICD-10-CM    1  Arthritis of left knee  M17 12 Ambulatory Referral to Physical Therapy       Dear Dr Dinh Loo: Thank you for your recent referral of Billie Khalil  Please review the attached evaluation summary from Tasha's recent visit  Please verify that you agree with the plan of care by signing the attached order  If you have any questions or concerns, please do not hesitate to call  I sincerely appreciate the opportunity to share in the care of one of your patients and hope to have another opportunity to work with you in the near future  Sincerely,    Blair Corey, PT      Referring Provider:      I certify that I have read the below Plan of Care and certify the need for these services furnished under this plan of treatment while under my care  [place] [id number]  [address]  Via Fax: [phone]          PT Evaluation     Today's date: 2022  Patient name: Billie Khalil  : 1975  MRN: [id number]  Referring provider: Magdalena Gomez  Dx:   Encounter Diagnosis     ICD-10-CM    1  Arthritis of left knee  M17 12 Ambulatory Referral to Physical Therapy                  Assessment  Assessment details: Pt is a pleasant 55 y o  female presenting to outpatient physical therapy with pain, decreased range of motion, decreased strength, and decreased tolerance to activity  Pt is a good candidate for outpatient physical therapy and would benefit from skilled physical therapy to address limitations and to achieve goals   Thank you for this referral    Impairments: abnormal coordination, abnormal or restricted ROM, activity intolerance, impaired physical strength and pain with function  Understanding of Dx/Px/POC: good   Prognosis: good    Goals  ST  Patient will report 25% decrease in pain in 4 weeks  2  Patient will demonstrate 25% improvement in ROM in 4 weeks  3  Patient will demonstrate 1/2 grade improvement in strength in 4 weeks  LT  Patient will be able to perform IADLS without restriction or pain by discharge  2  Patient will be independent in HEP by discharge  3  Patient will be able to return to recreational/work duties without restriction or pain by discharge  Plan  Patient would benefit from: PT eval and skilled PT  Planned modality interventions: cryotherapy and thermotherapy: hydrocollator packs  Planned therapy interventions: IADL retraining, body mechanics training, flexibility, functional ROM exercises, home exercise program, neuromuscular re-education, manual therapy, postural training, strengthening, stretching, therapeutic activities, therapeutic exercise and joint mobilization  Frequency: 2x week  Duration in visits: 12  Duration in weeks: 6  Treatment plan discussed with: patient        Subjective Evaluation    History of Present Illness  Mechanism of injury: Patient presents with L knee pain which began Easter weekend after doing some yardwork  No falls or notable experiences involving the L knee  (+) intermittent swelling, popping, locking, pain without pattern  Pain is located along the lateral aspect of the joint  Minimal pain control measures  Patient notes occasionally the L knee will pop and pain will decrease  Intermittent N/T, likely goes along with swelling  Stairs are improving, but difficult  Pain  Pain scale: sore  Pain scale at highest: I can't sleep because of it      Patient Goals  Patient goals for therapy: decreased pain          Objective     General Comments:      Knee Comments  L knee ROM 0-115 deg  (-) ligamentous laxity testing  (-) discomfort with SLS with hip IR/ER  (+) TTP over fibular head, lateral to patella, infrapatellar space  Limited HS length, PSLR 60 deg  Gait: slight decrease in stance time on L, decreased overall gait speed, heel strike  Moderate recreation of sxs only with step up/over on 8" step, no other symptom provocation with testing             Precautions: n/a      Manuals             L knee PROM                                                    Neuro Re-Ed             Quad set                                                                                           Ther Ex             Heel slide             Supine bridge             LAQ             HR             Low step up/down             Rockerboard taps             Standing hip abd/ext                          Ther Activity                                       Gait Training                                       Modalities             CP prn

## 2022-05-12 NOTE — PROGRESS NOTES
PT Evaluation     Today's date: 2022  Patient name: Pranav Moon  : 1975  MRN: [id number]  Referring provider: Shorty Capone  Dx:   Encounter Diagnosis     ICD-10-CM    1  Arthritis of left knee  M17 12 Ambulatory Referral to Physical Therapy                  Assessment  Assessment details: Pt is a pleasant 55 y o  female presenting to outpatient physical therapy with pain, decreased range of motion, decreased strength, and decreased tolerance to activity  Pt is a good candidate for outpatient physical therapy and would benefit from skilled physical therapy to address limitations and to achieve goals  Thank you for this referral    Impairments: abnormal coordination, abnormal or restricted ROM, activity intolerance, impaired physical strength and pain with function  Understanding of Dx/Px/POC: good   Prognosis: good    Goals  ST  Patient will report 25% decrease in pain in 4 weeks  2  Patient will demonstrate 25% improvement in ROM in 4 weeks  3  Patient will demonstrate 1/2 grade improvement in strength in 4 weeks  LT  Patient will be able to perform IADLS without restriction or pain by discharge  2  Patient will be independent in HEP by discharge  3  Patient will be able to return to recreational/work duties without restriction or pain by discharge        Plan  Patient would benefit from: PT eval and skilled PT  Planned modality interventions: cryotherapy and thermotherapy: hydrocollator packs  Planned therapy interventions: IADL retraining, body mechanics training, flexibility, functional ROM exercises, home exercise program, neuromuscular re-education, manual therapy, postural training, strengthening, stretching, therapeutic activities, therapeutic exercise and joint mobilization  Frequency: 2x week  Duration in visits: 12  Duration in weeks: 6  Treatment plan discussed with: patient        Subjective Evaluation    History of Present Illness  Mechanism of injury: Patient presents with L knee pain which began Easter weekend after doing some yardwork  No falls or notable experiences involving the L knee  (+) intermittent swelling, popping, locking, pain without pattern  Pain is located along the lateral aspect of the joint  Minimal pain control measures  Patient notes occasionally the L knee will pop and pain will decrease  Intermittent N/T, likely goes along with swelling  Stairs are improving, but difficult  Pain  Pain scale: sore  Pain scale at highest: I can't sleep because of it      Patient Goals  Patient goals for therapy: decreased pain          Objective     General Comments:      Knee Comments  L knee ROM 0-115 deg  (-) ligamentous laxity testing  (-) discomfort with SLS with hip IR/ER  (+) TTP over fibular head, lateral to patella, infrapatellar space  Limited HS length, PSLR 60 deg  Gait: slight decrease in stance time on L, decreased overall gait speed, heel strike  Moderate recreation of sxs only with step up/over on 8" step, no other symptom provocation with testing             Precautions: n/a      Manuals             L knee PROM                                                    Neuro Re-Ed             Quad set                                                                                           Ther Ex             Heel slide             Supine bridge             LAQ             HR             Low step up/down             Rockerboard taps             Standing hip abd/ext                          Ther Activity                                       Gait Training                                       Modalities             CP prn

## 2022-05-16 ENCOUNTER — OFFICE VISIT (OUTPATIENT)
Dept: PHYSICAL THERAPY | Facility: REHABILITATION | Age: 47
End: 2022-05-16
Payer: COMMERCIAL

## 2022-05-16 DIAGNOSIS — M17.12 ARTHRITIS OF LEFT KNEE: Primary | ICD-10-CM

## 2022-05-16 PROCEDURE — 97110 THERAPEUTIC EXERCISES: CPT

## 2022-05-16 PROCEDURE — 97112 NEUROMUSCULAR REEDUCATION: CPT

## 2022-05-16 NOTE — PROGRESS NOTES
Daily Note     Today's date: 2022  Patient name: Niru Escalera  : 1975  MRN: [id number]  Referring provider: Clare Valerio  Dx:   Encounter Diagnosis     ICD-10-CM    1  Arthritis of left knee  M17 12      Patient called and reported she is doing really well and would like to be D/C from PT  Start Time: 1745  Stop Time:   Total time in clinic (min): 31 minutes    Subjective: Reports her knee is doing okay  Still most difficulty with steps  Objective: See treatment diary below     Assessment: Tolerated treatment well  Mild discomfort towards end range flexion with VG eccentrics  Patient exhibited good technique with therapeutic exercises and would benefit from continued PT    Plan: Continue per plan of care        Precautions: n/a    Manuals             L knee PROM                                                    Neuro Re-Ed             Quad set             SLRs 4x5 2lb            Lateral band walk 3 laps 8 steps RHB            Leg ext 2x10            Lateral step down 3x8 4in            Standing hip flex/abd/ext YMB 15x ea            VG eccentrics 2x10 L7            Ther Ex             Heel slide             Supine bridge             LAQ             HR             Low step up/down             Rockerboard taps             Standing hip abd/ext             PROM QD            Ther Activity                                       Gait Training                                       Modalities             CP prn

## 2022-05-19 ENCOUNTER — APPOINTMENT (OUTPATIENT)
Dept: PHYSICAL THERAPY | Facility: REHABILITATION | Age: 47
End: 2022-05-19
Payer: COMMERCIAL

## 2022-05-23 ENCOUNTER — APPOINTMENT (OUTPATIENT)
Dept: PHYSICAL THERAPY | Facility: REHABILITATION | Age: 47
End: 2022-05-23
Payer: COMMERCIAL

## 2022-05-26 ENCOUNTER — APPOINTMENT (OUTPATIENT)
Dept: PHYSICAL THERAPY | Facility: REHABILITATION | Age: 47
End: 2022-05-26
Payer: COMMERCIAL

## 2022-05-31 ENCOUNTER — APPOINTMENT (OUTPATIENT)
Dept: PHYSICAL THERAPY | Facility: REHABILITATION | Age: 47
End: 2022-05-31
Payer: COMMERCIAL

## 2022-06-28 DIAGNOSIS — I10 BENIGN ESSENTIAL HYPERTENSION: ICD-10-CM

## 2022-06-28 RX ORDER — AMLODIPINE BESYLATE 5 MG/1
TABLET ORAL
Qty: 90 TABLET | Refills: 0 | Status: SHIPPED | OUTPATIENT
Start: 2022-06-28

## 2022-10-02 DIAGNOSIS — I10 BENIGN ESSENTIAL HYPERTENSION: ICD-10-CM

## 2022-10-03 RX ORDER — AMLODIPINE BESYLATE 5 MG/1
TABLET ORAL
Qty: 90 TABLET | Refills: 0 | Status: SHIPPED | OUTPATIENT
Start: 2022-10-03 | End: 2022-10-04

## 2022-10-04 ENCOUNTER — OFFICE VISIT (OUTPATIENT)
Dept: INTERNAL MEDICINE CLINIC | Facility: CLINIC | Age: 47
End: 2022-10-04
Payer: COMMERCIAL

## 2022-10-04 VITALS
HEIGHT: 69 IN | SYSTOLIC BLOOD PRESSURE: 118 MMHG | TEMPERATURE: 97.5 F | DIASTOLIC BLOOD PRESSURE: 84 MMHG | HEART RATE: 78 BPM | OXYGEN SATURATION: 99 % | BODY MASS INDEX: 33.62 KG/M2 | WEIGHT: 227 LBS

## 2022-10-04 DIAGNOSIS — K59.1 FUNCTIONAL DIARRHEA: ICD-10-CM

## 2022-10-04 DIAGNOSIS — J45.20 MILD INTERMITTENT ASTHMA WITHOUT COMPLICATION: ICD-10-CM

## 2022-10-04 DIAGNOSIS — I10 BENIGN ESSENTIAL HYPERTENSION: Primary | ICD-10-CM

## 2022-10-04 DIAGNOSIS — M25.472 LEFT ANKLE SWELLING: ICD-10-CM

## 2022-10-04 DIAGNOSIS — Z23 NEED FOR VACCINATION: ICD-10-CM

## 2022-10-04 DIAGNOSIS — E78.2 MIXED HYPERLIPIDEMIA: ICD-10-CM

## 2022-10-04 PROCEDURE — 99214 OFFICE O/P EST MOD 30 MIN: CPT | Performed by: NURSE PRACTITIONER

## 2022-10-04 PROCEDURE — 90471 IMMUNIZATION ADMIN: CPT | Performed by: NURSE PRACTITIONER

## 2022-10-04 PROCEDURE — 90682 RIV4 VACC RECOMBINANT DNA IM: CPT | Performed by: NURSE PRACTITIONER

## 2022-10-04 RX ORDER — LISINOPRIL 5 MG/1
5 TABLET ORAL DAILY
Qty: 90 TABLET | Refills: 0 | Status: SHIPPED | OUTPATIENT
Start: 2022-10-04

## 2022-10-04 NOTE — PROGRESS NOTES
Name: Abdiaziz Perdue      : 1975      MRN: [id number]  Encounter Provider: ADRIENNE Calvert  Encounter Date: 10/4/2022   Encounter department:  [facility]     1  Benign essential hypertension  Assessment & Plan: Will stop amlodipine due to swelling  Start lisinopril 5 mg daily  recommend home blood pressure monitoring  Nurse visit in 2 weeks for BP check    Orders:  -     lisinopril (ZESTRIL) 5 mg tablet; Take 1 tablet (5 mg total) by mouth daily    2  Mild intermittent asthma without complication  Assessment & Plan:  Stable  On singulair  Has not needed albuterol  Sees an allergist      3  Left ankle swelling  Comments:  stop amlodipine  check xray  refer to podiatry  swelling likely due to old injury  Orders:  -     Ambulatory Referral to Podiatry; Future  -     XR ankle 3+ vw left; Future; Expected date: 10/04/2022    4  Functional diarrhea  Assessment & Plan:  Stable  Continue questran  5  Mixed hyperlipidemia  Assessment & Plan:  Follow a low fat diet, exercise 20 minutes 4-5 times a week  6  Need for vaccination  -     influenza vaccine, quadrivalent, recombinant, PF, 0 5 mL, for patients 18 yr+ (FLUBLOK)         Subjective      China Kaur is here today for follow up  She is doing well overall  She has been having some left knee pain  She was seeing orthopedics  It has resolved currently however she has noticed she gets intermittently swelling in her left ankle  She did have a previous ankle injury about 5 years ago  It it better in the mornings and worse at the end of the day  She has a tingling sensation at times  Her breathing has been stable  She has not used her albuterol inhaler  Review of Systems   Constitutional: Negative for activity change, appetite change, fatigue and unexpected weight change  Eyes: Negative for visual disturbance     Respiratory: Negative for cough, chest tightness, shortness of breath and wheezing  Cardiovascular: Positive for leg swelling  Negative for chest pain and palpitations  Gastrointestinal: Negative for abdominal pain, blood in stool, constipation and diarrhea  Genitourinary: Negative for difficulty urinating  Musculoskeletal: Positive for arthralgias  Skin: Negative for rash  Neurological: Negative for dizziness, weakness, light-headedness and headaches  Psychiatric/Behavioral: Negative for sleep disturbance  Current Outpatient Medications on File Prior to Visit   Medication Sig    albuterol (PROVENTIL HFA,VENTOLIN HFA) 90 mcg/act inhaler Inhale 2 puffs every 6 (six) hours as needed for wheezing   Blood Pressure KIT by Does not apply route 2 (two) times a week    Cholecalciferol (Vitamin D) 50 MCG (2000 UT) CAPS every 24 hours    loratadine (CLARITIN) 10 mg tablet Take 10 mg by mouth daily    montelukast (SINGULAIR) 10 mg tablet Take 10 mg by mouth daily at bedtime  Pt not dure of dose, advised to bring list am of surgery    Multiple Vitamin (MULTIVITAMIN) capsule Take 1 capsule by mouth daily    TURMERIC CURCUMIN PO Take by mouth    [DISCONTINUED] amLODIPine (NORVASC) 5 mg tablet TAKE 1 TABLET(5 MG) BY MOUTH DAILY    cholestyramine (QUESTRAN) 4 g packet USE 1 PACKET AT BEDTIME AS NEEDED FOR LOOSE BOWEL MOVEMENTS    hydrocortisone-acetic acid (VOSOL-HC) otic solution Administer 3 drops to the right ear every 6 (six) hours    valACYclovir (VALTREX) 1,000 mg tablet Take 1 tablet (1,000 mg total) by mouth 2 (two) times a day as needed (cold sores) for up to 2 days       Objective     /84 (BP Location: Left arm, Patient Position: Sitting, Cuff Size: Large)   Pulse 78   Temp 97 5 °F (36 4 °C)   Ht 5' 9" (1 753 m)   Wt 103 kg (227 lb)   LMP 06/16/2016   SpO2 99%   BMI 33 52 kg/m²     Physical Exam  Vitals reviewed  Constitutional:       Appearance: She is well-developed  HENT:      Head: Normocephalic and atraumatic     Eyes: Conjunctiva/sclera: Conjunctivae normal    Cardiovascular:      Rate and Rhythm: Normal rate and regular rhythm  Heart sounds: Normal heart sounds  Pulmonary:      Effort: Pulmonary effort is normal       Breath sounds: Normal breath sounds  Abdominal:      General: Bowel sounds are normal       Palpations: Abdomen is soft  Musculoskeletal:         General: Normal range of motion  Cervical back: Neck supple  Left ankle: Swelling present  No tenderness  Normal range of motion  Normal pulse  Skin:     General: Skin is warm and dry  Neurological:      Mental Status: She is alert and oriented to person, place, and time     Psychiatric:         Behavior: Behavior normal        [facility]

## 2022-10-04 NOTE — ASSESSMENT & PLAN NOTE
Will stop amlodipine due to swelling  Start lisinopril 5 mg daily  recommend home blood pressure monitoring    Nurse visit in 2 weeks for BP check

## 2022-10-25 ENCOUNTER — VBI (OUTPATIENT)
Dept: ADMINISTRATIVE | Facility: OTHER | Age: 47
End: 2022-10-25

## 2023-01-04 DIAGNOSIS — I10 BENIGN ESSENTIAL HYPERTENSION: ICD-10-CM

## 2023-01-04 RX ORDER — LISINOPRIL 5 MG/1
TABLET ORAL
Qty: 90 TABLET | Refills: 0 | Status: SHIPPED | OUTPATIENT
Start: 2023-01-04

## 2023-04-04 ENCOUNTER — RA CDI HCC (OUTPATIENT)
Dept: OTHER | Facility: HOSPITAL | Age: 48
End: 2023-04-04

## 2023-04-04 NOTE — PROGRESS NOTES
[facility]  coding opportunities          Chart Reviewed number of suggestions sent to Provider: 1   [phone]    This is a reminder to address ALL [facility]  (risk adjustment) codes as found on active problem list for 2023 as patient scores reset to zero ROGER      Patients Insurance        Commercial Insurance: [address] [place] [address]

## 2023-04-11 PROBLEM — L03.90 CELLULITIS: Status: RESOLVED | Noted: 2018-05-18 | Resolved: 2023-04-11

## 2023-04-11 PROBLEM — R00.2 PALPITATIONS: Status: RESOLVED | Noted: 2022-03-29 | Resolved: 2023-04-11

## 2023-04-11 PROBLEM — K58.0 IRRITABLE BOWEL SYNDROME WITH DIARRHEA: Status: RESOLVED | Noted: 2019-07-10 | Resolved: 2023-04-11

## 2023-04-11 PROBLEM — B00.1 RECURRENT COLD SORES: Status: RESOLVED | Noted: 2018-08-10 | Resolved: 2023-04-11

## 2023-04-11 PROBLEM — S93.602A FOOT SPRAIN, LEFT, INITIAL ENCOUNTER: Status: RESOLVED | Noted: 2018-05-24 | Resolved: 2023-04-11

## 2023-04-11 PROBLEM — U07.1 COVID-19: Status: RESOLVED | Noted: 2021-02-19 | Resolved: 2023-04-11

## 2023-07-14 DIAGNOSIS — I10 BENIGN ESSENTIAL HYPERTENSION: ICD-10-CM

## 2023-07-14 RX ORDER — LISINOPRIL 5 MG/1
TABLET ORAL
Qty: 30 TABLET | Refills: 0 | Status: SHIPPED | OUTPATIENT
Start: 2023-07-14 | End: 2023-07-14

## 2023-07-14 RX ORDER — LISINOPRIL 5 MG/1
TABLET ORAL
Qty: 90 TABLET | Refills: 0 | Status: SHIPPED | OUTPATIENT
Start: 2023-07-14

## 2023-09-19 DIAGNOSIS — I10 BENIGN ESSENTIAL HYPERTENSION: ICD-10-CM

## 2023-09-19 RX ORDER — LISINOPRIL 5 MG/1
5 TABLET ORAL DAILY
Qty: 90 TABLET | Refills: 0 | Status: SHIPPED | OUTPATIENT
Start: 2023-09-19

## 2023-09-19 NOTE — TELEPHONE ENCOUNTER
Reason for call:   [x] Refill   [] Prior Auth  [] Other:     Office:   [x] PCP/Provider -   [] Speciality/Provider -     Medication: lisinopril  Dose/Frequency: 5mg 1 tab daily    Quantity: 30    Ph[facility]    Does the patient have enough for 3 days?    [] Yes   [x] No - Send as HP to POD

## 2023-10-07 ENCOUNTER — APPOINTMENT (OUTPATIENT)
Dept: LAB | Facility: CLINIC | Age: 48
End: 2023-10-07
Payer: COMMERCIAL

## 2023-10-07 DIAGNOSIS — Z00.00 ANNUAL PHYSICAL EXAM: ICD-10-CM

## 2023-10-07 LAB
ALBUMIN SERPL BCP-MCNC: 4.1 G/DL (ref 3.5–5)
ALP SERPL-CCNC: 54 U/L (ref 34–104)
ALT SERPL W P-5'-P-CCNC: 44 U/L (ref 7–52)
ANION GAP SERPL CALCULATED.3IONS-SCNC: 6 MMOL/L
AST SERPL W P-5'-P-CCNC: 30 U/L (ref 13–39)
BASOPHILS # BLD AUTO: 0.05 THOUSANDS/ÂΜL (ref 0–0.1)
BASOPHILS NFR BLD AUTO: 1 % (ref 0–1)
BILIRUB SERPL-MCNC: 0.59 MG/DL (ref 0.2–1)
BUN SERPL-MCNC: 9 MG/DL (ref 5–25)
CALCIUM SERPL-MCNC: 9.1 MG/DL (ref 8.4–10.2)
CHLORIDE SERPL-SCNC: 104 MMOL/L (ref 96–108)
CHOLEST SERPL-MCNC: 208 MG/DL
CO2 SERPL-SCNC: 27 MMOL/L (ref 21–32)
CREAT SERPL-MCNC: 0.72 MG/DL (ref 0.6–1.3)
EOSINOPHIL # BLD AUTO: 0.08 THOUSAND/ÂΜL (ref 0–0.61)
EOSINOPHIL NFR BLD AUTO: 1 % (ref 0–6)
ERYTHROCYTE [DISTWIDTH] IN BLOOD BY AUTOMATED COUNT: 12.2 % (ref 11.6–15.1)
GFR SERPL CREATININE-BSD FRML MDRD: 99 ML/MIN/1.73SQ M
GLUCOSE P FAST SERPL-MCNC: 84 MG/DL (ref 65–99)
HCT VFR BLD AUTO: 43 % (ref 34.8–46.1)
HDLC SERPL-MCNC: 48 MG/DL
HGB BLD-MCNC: 14.2 G/DL (ref 11.5–15.4)
IMM GRANULOCYTES # BLD AUTO: 0.03 THOUSAND/UL (ref 0–0.2)
IMM GRANULOCYTES NFR BLD AUTO: 0 % (ref 0–2)
LDLC SERPL CALC-MCNC: 130 MG/DL (ref 0–100)
LYMPHOCYTES # BLD AUTO: 2.47 THOUSANDS/ÂΜL (ref 0.6–4.47)
LYMPHOCYTES NFR BLD AUTO: 37 % (ref 14–44)
MCH RBC QN AUTO: 29.6 PG (ref 26.8–34.3)
MCHC RBC AUTO-ENTMCNC: 33 G/DL (ref 31.4–37.4)
MCV RBC AUTO: 90 FL (ref 82–98)
MONOCYTES # BLD AUTO: 0.37 THOUSAND/ÂΜL (ref 0.17–1.22)
MONOCYTES NFR BLD AUTO: 6 % (ref 4–12)
NEUTROPHILS # BLD AUTO: 3.74 THOUSANDS/ÂΜL (ref 1.85–7.62)
NEUTS SEG NFR BLD AUTO: 55 % (ref 43–75)
NRBC BLD AUTO-RTO: 0 /100 WBCS
PLATELET # BLD AUTO: 299 THOUSANDS/UL (ref 149–390)
PMV BLD AUTO: 9.7 FL (ref 8.9–12.7)
POTASSIUM SERPL-SCNC: 4.5 MMOL/L (ref 3.5–5.3)
PROT SERPL-MCNC: 6.8 G/DL (ref 6.4–8.4)
RBC # BLD AUTO: 4.79 MILLION/UL (ref 3.81–5.12)
SODIUM SERPL-SCNC: 137 MMOL/L (ref 135–147)
TRIGL SERPL-MCNC: 149 MG/DL
TSH SERPL DL<=0.05 MIU/L-ACNC: 2.04 UIU/ML (ref 0.45–4.5)
WBC # BLD AUTO: 6.74 THOUSAND/UL (ref 4.31–10.16)

## 2023-10-07 PROCEDURE — 80053 COMPREHEN METABOLIC PANEL: CPT

## 2023-10-07 PROCEDURE — 85025 COMPLETE CBC W/AUTO DIFF WBC: CPT

## 2023-10-07 PROCEDURE — 84443 ASSAY THYROID STIM HORMONE: CPT

## 2023-10-07 PROCEDURE — 80061 LIPID PANEL: CPT

## 2023-10-07 PROCEDURE — 36415 COLL VENOUS BLD VENIPUNCTURE: CPT

## 2023-10-10 ENCOUNTER — RA CDI HCC (OUTPATIENT)
Dept: OTHER | Facility: HOSPITAL | Age: 48
End: 2023-10-10

## 2023-10-10 NOTE — PROGRESS NOTES
[address] [facility] coding opportunities       Chart reviewed, no opportunity found: CHART REVIEWED, NO OPPORTUNITY FOUND        Patients Insurance        Commercial Insurance: [employer] [place]

## 2023-10-17 ENCOUNTER — OFFICE VISIT (OUTPATIENT)
Dept: INTERNAL MEDICINE CLINIC | Facility: CLINIC | Age: 48
End: 2023-10-17
Payer: COMMERCIAL

## 2023-10-17 VITALS
OXYGEN SATURATION: 98 % | WEIGHT: 235 LBS | BODY MASS INDEX: 34.8 KG/M2 | SYSTOLIC BLOOD PRESSURE: 118 MMHG | HEART RATE: 81 BPM | TEMPERATURE: 97.8 F | HEIGHT: 69 IN | DIASTOLIC BLOOD PRESSURE: 76 MMHG

## 2023-10-17 DIAGNOSIS — E78.2 MIXED HYPERLIPIDEMIA: ICD-10-CM

## 2023-10-17 DIAGNOSIS — K21.9 GASTROESOPHAGEAL REFLUX DISEASE WITHOUT ESOPHAGITIS: ICD-10-CM

## 2023-10-17 DIAGNOSIS — I10 BENIGN ESSENTIAL HYPERTENSION: ICD-10-CM

## 2023-10-17 DIAGNOSIS — K59.1 FUNCTIONAL DIARRHEA: ICD-10-CM

## 2023-10-17 DIAGNOSIS — Z00.00 ANNUAL PHYSICAL EXAM: Primary | ICD-10-CM

## 2023-10-17 DIAGNOSIS — J45.20 MILD INTERMITTENT ASTHMA WITHOUT COMPLICATION: ICD-10-CM

## 2023-10-17 DIAGNOSIS — Z23 ENCOUNTER FOR IMMUNIZATION: ICD-10-CM

## 2023-10-17 PROCEDURE — 99214 OFFICE O/P EST MOD 30 MIN: CPT | Performed by: NURSE PRACTITIONER

## 2023-10-17 PROCEDURE — 90686 IIV4 VACC NO PRSV 0.5 ML IM: CPT

## 2023-10-17 PROCEDURE — 99396 PREV VISIT EST AGE 40-64: CPT | Performed by: NURSE PRACTITIONER

## 2023-10-17 PROCEDURE — 90471 IMMUNIZATION ADMIN: CPT

## 2023-10-17 RX ORDER — LISINOPRIL 5 MG/1
5 TABLET ORAL DAILY
Qty: 30 TABLET | Refills: 5 | Status: SHIPPED | OUTPATIENT
Start: 2023-10-17 | End: 2023-10-18

## 2023-10-17 NOTE — PROGRESS NOTES
ADULT ANNUAL PHYSICAL  [facility] INTERNAL MEDICINE    NAME: Vania Gill  AGE: 50 y.o. SEX: female  : 1975     DATE: 10/17/2023     Assessment and Plan:     Problem List Items Addressed This Visit          Digestive    Functional diarrhea     Stable  Continue cholestyramine. GERD (gastroesophageal reflux disease)     Takes prilosec as needed. Respiratory    Asthma     Stable  Sees an allergist.  On singulair daily, albuterol PRN. Cardiovascular and Mediastinum    Benign essential hypertension     Stable  On lisinopril          Relevant Medications    lisinopril (ZESTRIL) 5 mg tablet       Other    Hyperlipidemia     Lipids worse. Recommend low fat diet, avoid fried fatty processed foods butter and cheese. Relevant Orders    Comprehensive metabolic panel    Lipid Panel with Direct LDL reflex     Other Visit Diagnoses       Annual physical exam    -  Primary    Encounter for immunization        Relevant Orders    influenza vaccine, quadrivalent, 0.5 mL, preservative-free, for adult and pediatric patients 6 mos+ (AFLURIA, FLUARIX, FLULAVAL, FLUZONE)            Immunizations and preventive care screenings were discussed with patient today. Appropriate education was printed on patient's after visit summary. Counseling:  Exercise: the importance of regular exercise/physical activity was discussed. Recommend exercise 3-5 times per week for at least 30 minutes. Return in about 6 months (around 2024) for Next scheduled follow up. Chief Complaint:     Chief Complaint   Patient presents with    Follow-up      History of Present Illness:     Adult Annual Physical   Patient here for a comprehensive physical exam. The patient reports no problems. She is doing well. Switched jobs recently. Works in [employer]. She is in school full-time. She joined the gym last week.      Last used her albuterol inhaler in September. Diet and Physical Activity  Diet/Nutrition: poor diet. Exercise:  recently joined a gym . Depression Screening  PHQ-2/9 Depression Screening           General Health  Sleep: sleeps well. Hearing: normal - none . Vision: goes for regular eye exams. Dental: regular dental visits. /GYN Health  Patient is: hysterectomy      Review of Systems:     Review of Systems   Constitutional:  Negative for activity change, appetite change, fatigue and unexpected weight change. Eyes:  Negative for visual disturbance. Respiratory:  Negative for cough and shortness of breath. Cardiovascular:  Negative for chest pain, palpitations and leg swelling. Gastrointestinal:  Negative for abdominal pain, blood in stool, constipation and diarrhea. Genitourinary:  Negative for difficulty urinating. Musculoskeletal:  Negative for arthralgias. Skin:  Negative for rash. Neurological:  Negative for dizziness, light-headedness and headaches. Psychiatric/Behavioral:  Negative for sleep disturbance.        Past Medical History:     Past Medical History:   Diagnosis Date    Anxiety     Asthma     Blood pressure elevated without history of HTN     Last Assessed:6/30/2012    COVID-19 2/19/2021    Depression     GERD (gastroesophageal reflux disease)     History of herpes simplex infection     History of hypertension     History of palpitations     Hyperlipidemia     IBS (irritable bowel syndrome)     Microscopic hematuria     Last Assessed:6/10/2014    Obesity     Palpitations 3/29/2022    Seasonal allergies     Superficial thrombophlebitis of leg     Vitamin D deficiency       Past Surgical History:     Past Surgical History:   Procedure Laterality Date    COLONOSCOPY      Last Assessed:7/9/2012    DENTAL SURGERY      MD BREAST AUGMENTATION WITH IMPLANT Bilateral 6/29/2016    Procedure: AUGMENTATION BREAST;  Surgeon: Bette Salazar MD;  Location: [facility] OR;  Service: Plastics    MD TJS VAGINAL HYSTERECTOMY UTERUS 250 GM/< N/A 6/30/2017    Procedure: LAPAROSCOPIC ASSISTED VAGINAL HYSTERECTOMY AND BILATERAL REMOVAL OF Shama Kaur;  Surgeon: Marcio Grossman MD;  Location: [facility] MAIN OR;  Service: Gynecology      Social History:     Social History     Socioeconomic History    Marital status: /Civil Union     Spouse name: None    Number of children: None    Years of education: None    Highest education level: None   Occupational History    Occupation: Currently working   Tobacco Use    Smoking status: Never    Smokeless tobacco: Never   Vaping Use    Vaping Use: Never used   Substance and Sexual Activity    Alcohol use: Yes     Comment: sovial use of beer and wine    Drug use: No    Sexual activity: None   Other Topics Concern    None   Social History Narrative    Marital maladjustment involving divorce    Currently working     Social Determinants of Health     Financial Resource Strain: Not on file   Food Insecurity: Not on file   Transportation Needs: Not on file   Physical Activity: Not on file   Stress: Not on file   Social Connections: Not on file   Intimate Partner Violence: Not on file   Housing Stability: Not on file      Family History:     Family History   Problem Relation Age of Onset    Heart attack Father         52's    Colon cancer Father     Coronary artery disease Father     Diabetes Father     Other Father         kidney transplant 6/2018    Cancer Father         Brain Cancer     Heart attack Family     Anxiety disorder Other     Rectal cancer Maternal Grandfather     Alcohol abuse Neg Hx     Substance Abuse Neg Hx     Mental illness Neg Hx     Depression Neg Hx       Current Medications:     Current Outpatient Medications   Medication Sig Dispense Refill    albuterol (PROVENTIL HFA,VENTOLIN HFA) 90 mcg/act inhaler Inhale 2 puffs every 6 (six) hours as needed for wheezing.       Blood Pressure KIT by Does not apply route 2 (two) times a week 1 each 0    Cholecalciferol (Vitamin D) 50 MCG (2000 UT) CAPS every 24 hours      cholestyramine light (PREVALITE) 4 GM/DOSE powder USE 1 PACKET BY MOUTH EVERY NIGHT AT BEDTIME AS NEEDED FOR LOOSE BOWEL MOVEMENTS 718.2 g 3    hydrocortisone-acetic acid (VOSOL-HC) otic solution Administer 3 drops to the right ear every 6 (six) hours 10 mL 0    lisinopril (ZESTRIL) 5 mg tablet Take 1 tablet (5 mg total) by mouth daily 30 tablet 5    loratadine (CLARITIN) 10 mg tablet Take 10 mg by mouth daily      montelukast (SINGULAIR) 10 mg tablet Take 10 mg by mouth daily at bedtime. Pt not dure of dose, advised to bring list am of surgery      Multiple Vitamin (MULTIVITAMIN) capsule Take 1 capsule by mouth daily      TURMERIC CURCUMIN PO Take by mouth      valACYclovir (VALTREX) 1,000 mg tablet Take 1 tablet (1,000 mg total) by mouth 2 (two) times a day as needed (cold sores) for up to 2 days 20 tablet 0     No current facility-administered medications for this visit. Allergies: Allergies   Allergen Reactions    Erythromycin      Other reaction(s): GI Upset      Physical Exam:     /76   Pulse 81   Temp 97.8 °F (36.6 °C)   Ht 5' 9" (1.753 m)   Wt 107 kg (235 lb)   LMP 06/16/2016   SpO2 98%   BMI 34.70 kg/m²     Physical Exam  Vitals reviewed. Constitutional:       Appearance: Normal appearance. HENT:      Head: Normocephalic and atraumatic. Right Ear: Tympanic membrane, ear canal and external ear normal.      Left Ear: Tympanic membrane, ear canal and external ear normal.   Eyes:      Conjunctiva/sclera: Conjunctivae normal.   Cardiovascular:      Rate and Rhythm: Normal rate and regular rhythm. Heart sounds: Normal heart sounds. Pulmonary:      Effort: Pulmonary effort is normal.      Breath sounds: Normal breath sounds. Abdominal:      General: Bowel sounds are normal.      Palpations: Abdomen is soft. Musculoskeletal:         General: Normal range of motion. Cervical back: Neck supple.       Right lower leg: No edema. Left lower leg: No edema. Skin:     General: Skin is warm and dry. Neurological:      Mental Status: She is alert and oriented to person, place, and time.    Psychiatric:         Mood and Affect: Mood normal.         Behavior: Behavior normal.          Mariangel Denis, [address] INTERNAL MEDICINE

## 2023-10-18 RX ORDER — LISINOPRIL 5 MG/1
5 TABLET ORAL DAILY
Qty: 90 TABLET | Refills: 1 | Status: SHIPPED | OUTPATIENT
Start: 2023-10-18

## 2024-01-05 ENCOUNTER — VBI (OUTPATIENT)
Dept: ADMINISTRATIVE | Facility: OTHER | Age: 49
End: 2024-01-05

## 2024-01-09 ENCOUNTER — TELEPHONE (OUTPATIENT)
Dept: INTERNAL MEDICINE CLINIC | Facility: CLINIC | Age: 49
End: 2024-01-09

## 2024-01-09 NOTE — TELEPHONE ENCOUNTER
Appointment Request From: Tasha Gill     With Provider: Yoly Denis [-Primary Care Physician-]     Preferred Date Range: Any date 1/9/2024 or later     Preferred Times: Any     Reason: To address the following health maintenance concerns.  Breast Cancer Screening: Mammogram     Comments:  Hello!  I was just notified that the previous place that I had my mammograms done has now closed. I need find a new location. Do you have a recommendation?     I

## 2024-01-30 ENCOUNTER — TELEPHONE (OUTPATIENT)
Age: 49
End: 2024-01-30

## 2024-01-30 DIAGNOSIS — K58.0 IRRITABLE BOWEL SYNDROME WITH DIARRHEA: ICD-10-CM

## 2024-01-30 NOTE — TELEPHONE ENCOUNTER
Reason for call:     Patient requesting a new script for cholestyramine can states insurance will not cover the packets.    [x] Refill   [] Prior Auth  [] Other:     Office:   [] PCP/Provider -   [x] Specialty/Provider - ESCOBAR DIXON    Medication:   Cholestyramine CAN not packet    Pharmacy:   [facility] [address]    Does the patient have enough for 3 days?   [x] Yes   [] No - Send as HP to POD

## 2024-04-23 ENCOUNTER — OFFICE VISIT (OUTPATIENT)
Dept: INTERNAL MEDICINE CLINIC | Facility: CLINIC | Age: 49
End: 2024-04-23
Payer: COMMERCIAL

## 2024-04-23 VITALS
TEMPERATURE: 97.5 F | BODY MASS INDEX: 35.74 KG/M2 | WEIGHT: 241.3 LBS | HEIGHT: 69 IN | SYSTOLIC BLOOD PRESSURE: 138 MMHG | OXYGEN SATURATION: 98 % | DIASTOLIC BLOOD PRESSURE: 98 MMHG | HEART RATE: 101 BPM

## 2024-04-23 DIAGNOSIS — K21.9 GASTROESOPHAGEAL REFLUX DISEASE WITHOUT ESOPHAGITIS: ICD-10-CM

## 2024-04-23 DIAGNOSIS — J45.20 MILD INTERMITTENT ASTHMA WITHOUT COMPLICATION: ICD-10-CM

## 2024-04-23 DIAGNOSIS — E78.2 MIXED HYPERLIPIDEMIA: ICD-10-CM

## 2024-04-23 DIAGNOSIS — K59.1 FUNCTIONAL DIARRHEA: ICD-10-CM

## 2024-04-23 DIAGNOSIS — I10 BENIGN ESSENTIAL HYPERTENSION: Primary | ICD-10-CM

## 2024-04-23 DIAGNOSIS — I10 BENIGN ESSENTIAL HYPERTENSION: ICD-10-CM

## 2024-04-23 PROCEDURE — 99214 OFFICE O/P EST MOD 30 MIN: CPT | Performed by: NURSE PRACTITIONER

## 2024-04-23 RX ORDER — LISINOPRIL 10 MG/1
10 TABLET ORAL DAILY
Qty: 30 TABLET | Refills: 5 | Status: SHIPPED | OUTPATIENT
Start: 2024-04-23 | End: 2024-04-24

## 2024-04-23 NOTE — ASSESSMENT & PLAN NOTE
Increase lisinopril to 10 mg daily   Recommend home blood pressure monitoring. Call the office or send a mychart in 2-3 weeks with BP log.

## 2024-04-23 NOTE — PROGRESS NOTES
Name: Tasha Gill      : 1975      MRN: [id number]  Encounter Provider: ADRIENNE Linares  Encounter Date: 2024   Encounter department: [facility] INTERNAL MEDICINE    Assessment & Plan     1. Benign essential hypertension  Assessment & Plan:  Increase lisinopril to 10 mg daily   Recommend home blood pressure monitoring. Call the office or send a mychart in 2-3 weeks with BP log.     Orders:  -     lisinopril (ZESTRIL) 10 mg tablet; Take 1 tablet (10 mg total) by mouth daily    2. Mild intermittent asthma without complication  Assessment & Plan:  Stable.  On singulair daily.   Uses albuterol as needed.   Sees an allergist       3. Gastroesophageal reflux disease without esophagitis  Assessment & Plan:  Takes omeprazole as needed, about twice weekly.       4. Functional diarrhea  Assessment & Plan:  On cholestyramine.       5. Mixed hyperlipidemia  Assessment & Plan:  Due for lipids.   Continue walking daily.              Subjective      Tasha is here today for follow up  She is doing well.     She started walking 2 miles daily.   She has not checked her blood pressures at home.     She was using her inhaler prior to walking but hasn't needed it the last week.           Review of Systems   Constitutional:  Negative for activity change, appetite change and fatigue.   Eyes:  Negative for visual disturbance.   Respiratory:  Negative for cough, chest tightness, shortness of breath and wheezing.    Cardiovascular:  Negative for chest pain, palpitations and leg swelling.   Gastrointestinal:  Negative for abdominal pain, constipation and diarrhea.   Genitourinary:  Negative for difficulty urinating.   Neurological:  Negative for dizziness, light-headedness and headaches.   Psychiatric/Behavioral:  Negative for sleep disturbance.        Current Outpatient Medications on File Prior to Visit   Medication Sig    albuterol (PROVENTIL HFA,VENTOLIN HFA) 90 mcg/act inhaler Inhale 2 puffs  "every 6 (six) hours as needed for wheezing.    Blood Pressure KIT by Does not apply route 2 (two) times a week    Cholecalciferol (Vitamin D) 50 MCG (2000 UT) CAPS every 24 hours    cholestyramine light (PREVALITE) 4 GM/DOSE powder Take 1 packet (4 g total) by mouth 2 (two) times a day with meals    hydrocortisone-acetic acid (VOSOL-HC) otic solution Administer 3 drops to the right ear every 6 (six) hours    loratadine (CLARITIN) 10 mg tablet Take 10 mg by mouth daily    montelukast (SINGULAIR) 10 mg tablet Take 10 mg by mouth daily at bedtime. Pt not dure of dose, advised to bring list am of surgery    Multiple Vitamin (MULTIVITAMIN) capsule Take 1 capsule by mouth daily    TURMERIC CURCUMIN PO Take by mouth    [DISCONTINUED] lisinopril (ZESTRIL) 5 mg tablet TAKE 1 TABLET(5 MG) BY MOUTH DAILY    valACYclovir (VALTREX) 1,000 mg tablet Take 1 tablet (1,000 mg total) by mouth 2 (two) times a day as needed (cold sores) for up to 2 days       Objective     /98   Pulse 101   Temp 97.5 °F (36.4 °C)   Ht 5' 9\" (1.753 m)   Wt 109 kg (241 lb 4.8 oz)   LMP 06/16/2016   SpO2 98%   BMI 35.63 kg/m²     Physical Exam  Vitals reviewed.   Constitutional:       Appearance: Normal appearance. She is well-developed.   HENT:      Head: Normocephalic and atraumatic.   Eyes:      Conjunctiva/sclera: Conjunctivae normal.   Neck:      Thyroid: No thyromegaly.   Cardiovascular:      Rate and Rhythm: Normal rate and regular rhythm.      Heart sounds: Normal heart sounds.   Pulmonary:      Effort: Pulmonary effort is normal.      Breath sounds: Normal breath sounds.   Abdominal:      General: Bowel sounds are normal.      Palpations: Abdomen is soft.   Neurological:      Mental Status: She is alert and oriented to person, place, and time.   Psychiatric:         Mood and Affect: Mood normal.         Behavior: Behavior normal.       ADRIENNE Linares    "

## 2024-04-24 RX ORDER — LISINOPRIL 10 MG/1
10 TABLET ORAL DAILY
Qty: 90 TABLET | Refills: 1 | Status: SHIPPED | OUTPATIENT
Start: 2024-04-24

## 2024-05-01 ENCOUNTER — HOSPITAL ENCOUNTER (OUTPATIENT)
Dept: MAMMOGRAPHY | Facility: HOSPITAL | Age: 49
Discharge: HOME | End: 2024-05-01
Payer: COMMERCIAL

## 2024-05-01 VITALS — HEIGHT: 69 IN | WEIGHT: 240.3 LBS | BODY MASS INDEX: 35.59 KG/M2

## 2024-05-01 DIAGNOSIS — Z12.31 ENCOUNTER FOR SCREENING MAMMOGRAM FOR MALIGNANT NEOPLASM OF BREAST: ICD-10-CM

## 2024-05-01 PROCEDURE — 77067 SCR MAMMO BI INCL CAD: CPT

## 2024-05-01 PROCEDURE — 77063 BREAST TOMOSYNTHESIS BI: CPT

## 2024-05-09 ENCOUNTER — TELEPHONE (OUTPATIENT)
Dept: INTERNAL MEDICINE CLINIC | Facility: CLINIC | Age: 49
End: 2024-05-09

## 2024-05-09 NOTE — TELEPHONE ENCOUNTER
Spoke with patient   States she has been taking BP but things may not be right , readings do seem to be consistent 05/06 137/88 sitting at kitchen, 05/07 149/100- laying in bed   05/08 /90  Patient takes medication before bed

## 2024-05-10 DIAGNOSIS — I10 BENIGN ESSENTIAL HYPERTENSION: ICD-10-CM

## 2024-05-10 RX ORDER — LISINOPRIL 20 MG/1
20 TABLET ORAL DAILY
Qty: 90 TABLET | Refills: 0 | Status: SHIPPED | OUTPATIENT
Start: 2024-05-10

## 2024-05-10 NOTE — TELEPHONE ENCOUNTER
Confirm she is taking lisinopril 10 mg daily, if so please increase to 20 mg daily. Continue home blood pressure monitoring and call/send a mychart in 2 weeks

## 2024-05-10 NOTE — TELEPHONE ENCOUNTER
Patient would like to come in for a Nurse visit BP check  She wants to see if her machine is accurate and make sure she is using properly.    Advised if not today PCP is out all next week.    She wants to know if you would be ok with waiting to adjust the medication until the following week around the 24th so she can come in

## 2024-05-10 NOTE — TELEPHONE ENCOUNTER
Spoke with patient, Advised patient to start taking 20 mg- she asked if the montelukast (SINGULAIR) 10 mg tablet has anything to do with her BP getting high? She feels like it didn't start going up until she started that.

## 2024-08-18 DIAGNOSIS — I10 BENIGN ESSENTIAL HYPERTENSION: ICD-10-CM

## 2024-08-18 RX ORDER — LISINOPRIL 20 MG/1
20 TABLET ORAL DAILY
Qty: 90 TABLET | Refills: 1 | Status: SHIPPED | OUTPATIENT
Start: 2024-08-18

## 2024-09-04 ENCOUNTER — PREP FOR PROCEDURE (OUTPATIENT)
Age: 49
End: 2024-09-04

## 2024-09-04 ENCOUNTER — TELEPHONE (OUTPATIENT)
Age: 49
End: 2024-09-04

## 2024-09-04 DIAGNOSIS — Z80.0 FAMILY HX OF COLON CANCER: Primary | ICD-10-CM

## 2024-09-04 NOTE — TELEPHONE ENCOUNTER
09/04/24  Screened by: Henna Downing MA    Referring Provider 5 YR REPEAT    Pre- Screening:     There is no height or weight on file to calculate BMI.  Has patient been referred for a routine screening Colonoscopy? yes  Is the patient between 45-75 years old? yes      Previous Colonoscopy yes   If yes:    Date: 10/08/2019    Facility: [facility]    Reason: FAMILY HX COLON CA        Does the patient want to see a Gastroenterologist prior to their procedure OR are they having any GI symptoms? no    Has the patient been hospitalized or had abdominal surgery in the past 6 months? no    Does the patient use supplemental oxygen? no    Does the patient take Coumadin, Lovenox, Plavix, Elliquis, Xarelto, or other blood thinning medication? no    Has the patient had a stroke, cardiac event, or stent placed in the past year? no        If patient is between 45yrs - 49yrs, please advise patient that we will have to confirm benefits & coverage with their insurance company for a routine screening colonoscopy.

## 2024-09-04 NOTE — TELEPHONE ENCOUNTER
Scheduled date of colonoscopy (as of today): 10/21/2024  Physician performing colonoscopy: DR MUSTAFA  Location of colonoscopy: [facility]  Bowel prep reviewed with patient: MIRALAX/DULCOLAX  Instructions reviewed with patient by: Henna via telephone. Procedure directions sent via [employer].  Clearances:

## 2024-10-14 ENCOUNTER — RA CDI HCC (OUTPATIENT)
Dept: OTHER | Facility: HOSPITAL | Age: 49
End: 2024-10-14

## 2024-10-21 ENCOUNTER — ANESTHESIA EVENT (OUTPATIENT)
Dept: GASTROENTEROLOGY | Facility: HOSPITAL | Age: 49
End: 2024-10-21
Payer: COMMERCIAL

## 2024-10-21 ENCOUNTER — ANESTHESIA (OUTPATIENT)
Dept: GASTROENTEROLOGY | Facility: HOSPITAL | Age: 49
End: 2024-10-21
Payer: COMMERCIAL

## 2024-10-21 ENCOUNTER — HOSPITAL ENCOUNTER (OUTPATIENT)
Dept: GASTROENTEROLOGY | Facility: HOSPITAL | Age: 49
Setting detail: OUTPATIENT SURGERY
Discharge: HOME | End: 2024-10-21
Attending: COLON & RECTAL SURGERY
Payer: COMMERCIAL

## 2024-10-21 VITALS
SYSTOLIC BLOOD PRESSURE: 112 MMHG | TEMPERATURE: 97.2 F | OXYGEN SATURATION: 99 % | WEIGHT: 230.4 LBS | RESPIRATION RATE: 16 BRPM | HEIGHT: 69 IN | BODY MASS INDEX: 34.13 KG/M2 | HEART RATE: 79 BPM | DIASTOLIC BLOOD PRESSURE: 67 MMHG

## 2024-10-21 DIAGNOSIS — Z80.0 FAMILY HX OF COLON CANCER: ICD-10-CM

## 2024-10-21 PROCEDURE — 88305 TISSUE EXAM BY PATHOLOGIST: CPT | Performed by: PATHOLOGY

## 2024-10-21 PROCEDURE — 45380 COLONOSCOPY AND BIOPSY: CPT | Performed by: COLON & RECTAL SURGERY

## 2024-10-21 RX ORDER — SODIUM CHLORIDE, SODIUM LACTATE, POTASSIUM CHLORIDE, CALCIUM CHLORIDE 600; 310; 30; 20 MG/100ML; MG/100ML; MG/100ML; MG/100ML
INJECTION, SOLUTION INTRAVENOUS CONTINUOUS PRN
Status: DISCONTINUED | OUTPATIENT
Start: 2024-10-21 | End: 2024-10-21

## 2024-10-21 RX ORDER — PROPOFOL 10 MG/ML
INJECTION, EMULSION INTRAVENOUS AS NEEDED
Status: DISCONTINUED | OUTPATIENT
Start: 2024-10-21 | End: 2024-10-21

## 2024-10-21 RX ADMIN — PROPOFOL 25 MG: 10 INJECTION, EMULSION INTRAVENOUS at 07:42

## 2024-10-21 RX ADMIN — SODIUM CHLORIDE, SODIUM LACTATE, POTASSIUM CHLORIDE, AND CALCIUM CHLORIDE: .6; .31; .03; .02 INJECTION, SOLUTION INTRAVENOUS at 07:00

## 2024-10-21 RX ADMIN — PROPOFOL 25 MG: 10 INJECTION, EMULSION INTRAVENOUS at 07:41

## 2024-10-21 RX ADMIN — PROPOFOL 25 MG: 10 INJECTION, EMULSION INTRAVENOUS at 07:38

## 2024-10-21 RX ADMIN — PROPOFOL 25 MG: 10 INJECTION, EMULSION INTRAVENOUS at 07:36

## 2024-10-21 RX ADMIN — PROPOFOL 25 MG: 10 INJECTION, EMULSION INTRAVENOUS at 07:44

## 2024-10-21 RX ADMIN — PROPOFOL 25 MG: 10 INJECTION, EMULSION INTRAVENOUS at 07:34

## 2024-10-21 RX ADMIN — PROPOFOL 25 MG: 10 INJECTION, EMULSION INTRAVENOUS at 07:39

## 2024-10-21 RX ADMIN — PROPOFOL 25 MG: 10 INJECTION, EMULSION INTRAVENOUS at 07:37

## 2024-10-21 RX ADMIN — PROPOFOL 25 MG: 10 INJECTION, EMULSION INTRAVENOUS at 07:45

## 2024-10-21 RX ADMIN — PROPOFOL 25 MG: 10 INJECTION, EMULSION INTRAVENOUS at 07:35

## 2024-10-21 RX ADMIN — PROPOFOL 25 MG: 10 INJECTION, EMULSION INTRAVENOUS at 07:40

## 2024-10-21 RX ADMIN — PROPOFOL 100 MG: 10 INJECTION, EMULSION INTRAVENOUS at 07:33

## 2024-10-21 RX ADMIN — PROPOFOL 25 MG: 10 INJECTION, EMULSION INTRAVENOUS at 07:43

## 2024-10-21 NOTE — ANESTHESIA POSTPROCEDURE EVALUATION
Post-Op Assessment Note    CV Status:  Stable    Pain management: adequate       Mental Status:  Sleepy   Hydration Status:  Euvolemic   PONV Controlled:  Controlled   Airway Patency:  Patent     Post Op Vitals Reviewed: Yes    No anethesia notable event occurred.    Staff: CRNA           Last Filed PACU Vitals:  Vitals Value Taken Time   Temp 97.1 °F (36.2 °C) 10/21/24 0750   Pulse 86 10/21/24 0750   /62 10/21/24 0750   Resp     SpO2         Modified Estefani:  Activity: 2 (10/21/2024  6:50 AM)  Respiration: 2 (10/21/2024  6:50 AM)  Circulation: 2 (10/21/2024  6:50 AM)  Consciousness: 2 (10/21/2024  6:50 AM)  Oxygen Saturation: 2 (10/21/2024  6:50 AM)  Modified Estefani Score: 10 (10/21/2024  6:50 AM)

## 2024-10-21 NOTE — ANESTHESIA PREPROCEDURE EVALUATION
Procedure:  COLONOSCOPY    Relevant Problems   ANESTHESIA   (-) History of anesthesia complications      CARDIO   (+) Benign essential hypertension   (+) Hyperlipidemia   (-) Chest pain   (-) US (dyspnea on exertion)      GI/HEPATIC   (+) GERD (gastroesophageal reflux disease)      PULMONARY   (+) Asthma (Rare inhaler use)   (-) Sleep apnea   (-) URI (upper respiratory infection)        Physical Exam    Airway    Mallampati score: II  TM Distance: >3 FB  Neck ROM: full     Dental       Cardiovascular      Pulmonary      Other Findings  post-pubertal.      Anesthesia Plan  ASA Score- 2     Anesthesia Type- IV sedation with anesthesia with ASA Monitors.         Additional Monitors:     Airway Plan:            Plan Factors-Exercise tolerance (METS): >4 METS.    Chart reviewed. EKG reviewed.  Existing labs reviewed. Patient summary reviewed.                  Induction- intravenous.    Postoperative Plan-         Informed Consent- Anesthetic plan and risks discussed with patient.  I personally reviewed this patient with the CRNA. Discussed and agreed on the Anesthesia Plan with the CRNA..

## 2024-10-21 NOTE — H&P
History and Physical   Colon and Rectal Surgery   Tasha Gill 49 y.o. female MRN: [id number]  Unit/Bed#:  Encounter: [id number]  10/21/24   @NOW    No chief complaint on file.        History of Present Illness   HPI:  Tasha Gill is a 49 y.o. female who presents for screening colonoscopy for family history of colorectal cancer.      Historical Information   Past Medical History:   Diagnosis Date    Anxiety     Asthma     Blood pressure elevated without history of HTN     Last Assessed:6/30/2012    COVID-19 2/19/2021    Depression     GERD (gastroesophageal reflux disease)     History of herpes simplex infection     History of hypertension     History of palpitations     Hyperlipidemia     IBS (irritable bowel syndrome)     Microscopic hematuria     Last Assessed:6/10/2014    Obesity     Palpitations 3/29/2022    Seasonal allergies     Superficial thrombophlebitis of leg     Vitamin D deficiency      Past Surgical History:   Procedure Laterality Date    AUGMENTATION MAMMAPLASTY Bilateral     age 42    COLONOSCOPY      Last Assessed:7/9/2012    DENTAL SURGERY      HYSTERECTOMY      age 40    VA BREAST AUGMENTATION WITH IMPLANT Bilateral 06/29/2016    Procedure: AUGMENTATION BREAST;  Surgeon: Trevor Vázquez MD;  Location:  MAIN OR;  Service: Plastics    VA LAPS VAGINAL HYSTERECTOMY UTERUS 250 GM/< N/A 06/30/2017    Procedure: LAPAROSCOPIC ASSISTED VAGINAL HYSTERECTOMY AND BILATERAL REMOVAL OF FALOPIAN TUBES;  Surgeon: Moon Quinonez MD;  Location: [facility] OR;  Service: Gynecology       Meds/Allergies     Not in a hospital admission.      Current Outpatient Medications:     montelukast (SINGULAIR) 10 mg tablet, Take 10 mg by mouth daily at bedtime. Pt not dure of dose, advised to bring list am of surgery, Disp: , Rfl:     Multiple Vitamin (MULTIVITAMIN) capsule, Take 1 capsule by mouth daily, Disp: , Rfl:     albuterol (PROVENTIL HFA,VENTOLIN HFA) 90 mcg/act inhaler, Inhale 2 puffs every 6 (six)  hours as needed for wheezing., Disp: , Rfl:     Cholecalciferol (Vitamin D) 50 MCG (2000 UT) CAPS, every 24 hours, Disp: , Rfl:     hydrocortisone-acetic acid (VOSOL-HC) otic solution, Administer 3 drops to the right ear every 6 (six) hours, Disp: 10 mL, Rfl: 0    lisinopril (ZESTRIL) 20 mg tablet, TAKE 1 TABLET(20 MG) BY MOUTH DAILY (Patient taking differently: Take 20 mg by mouth daily at bedtime), Disp: 90 tablet, Rfl: 1    loratadine (CLARITIN) 10 mg tablet, Take 10 mg by mouth as needed, Disp: , Rfl:     Prevalite 4 GM/DOSE powder, TAKE 1 SCOOP BY MOUTH 2 TIMES A DAY WITH MEALS, Disp: 718.2 g, Rfl: 0    valACYclovir (VALTREX) 1,000 mg tablet, Take 1 tablet (1,000 mg total) by mouth 2 (two) times a day as needed (cold sores) for up to 2 days, Disp: 20 tablet, Rfl: 0    Allergies   Allergen Reactions    Erythromycin      Other reaction(s): GI Upset         Social History   Social History     Substance and Sexual Activity   Alcohol Use Yes    Comment: social use of beer and wine     Social History     Substance and Sexual Activity   Drug Use No     Social History     Tobacco Use   Smoking Status Never   Smokeless Tobacco Never         Family History:   Family History   Problem Relation Age of Onset    Heart attack Father         50's    Colon cancer Father     Coronary artery disease Father     Diabetes Father     Other Father         kidney transplant 6/2018    Cancer Father         Brain Cancer     No Known Problems Sister     Breast cancer Sister 37    Anxiety disorder Sister     No Known Problems Daughter     Rectal cancer Maternal Grandfather     No Known Problems Brother     No Known Problems Maternal Aunt     No Known Problems Maternal Aunt     Alcohol abuse Neg Hx     Substance Abuse Neg Hx     Mental illness Neg Hx     Depression Neg Hx          Objective     Current Vitals:   Blood Pressure: 133/88 (10/21/24 0704)  Pulse: 82 (10/21/24 0704)  Temperature: (!) 97 °F (36.1 °C) (10/21/24 0704)  Temp Source:  "Temporal (10/21/24 0704)  Respirations: 15 (10/21/24 0704)  Height: 5' 9\" (175.3 cm) (10/21/24 0704)  Weight - Scale: 105 kg (230 lb 6.4 oz) (10/21/24 0704)  SpO2: 97 % (10/21/24 0704)  No intake or output data in the 24 hours ending 10/21/24 0719    Physical Exam:  General:  Resting comfortably in bed   Eyes:Sclera anicteric  ENT: Trachea midline  Pulm:  Symmetric chest raise.  No respiratory Distress  CV:  Regular on monitor  Abdomen:  Soft NT ND  Extremities:  No clubbing/ cyanosis/ edema    Lab Results: I have personally reviewed pertinent lab results.    Imaging: Results Review Statement: No pertinent imaging studies reviewed.      ASSESSMENT:  Tasha LANG Salenabaudilio is a 49 y.o. female who presents for outpatient colonoscopy.      PLAN:  For colonoscopy    Risks/ Benefits reviewed to include but not limited to anesthesia, bleeding, missed lesions, and colonoscopic perforation requiring surgery.      "

## 2024-10-21 NOTE — ANESTHESIA POSTPROCEDURE EVALUATION
Post-Op Assessment Note    Last Filed PACU Vitals:  Vitals Value Taken Time   Temp 97.2 °F (36.2 °C) 10/21/24 0810   Pulse 79 10/21/24 0810   /67 10/21/24 0810   Resp 16 10/21/24 0810   SpO2 99 % 10/21/24 0810       Modified Estefani:  Activity: 2 (10/21/2024  8:10 AM)  Respiration: 2 (10/21/2024  8:10 AM)  Circulation: 2 (10/21/2024  8:10 AM)  Consciousness: 2 (10/21/2024  8:10 AM)  Oxygen Saturation: 2 (10/21/2024  8:10 AM)  Modified Estefani Score: 10 (10/21/2024  8:10 AM)

## 2024-10-22 ENCOUNTER — OFFICE VISIT (OUTPATIENT)
Dept: INTERNAL MEDICINE CLINIC | Facility: CLINIC | Age: 49
End: 2024-10-22
Payer: COMMERCIAL

## 2024-10-22 VITALS
RESPIRATION RATE: 16 BRPM | HEART RATE: 98 BPM | HEIGHT: 68 IN | WEIGHT: 232 LBS | DIASTOLIC BLOOD PRESSURE: 86 MMHG | TEMPERATURE: 98 F | SYSTOLIC BLOOD PRESSURE: 126 MMHG | BODY MASS INDEX: 35.16 KG/M2 | OXYGEN SATURATION: 98 %

## 2024-10-22 DIAGNOSIS — Z90.710 S/P HYSTERECTOMY: ICD-10-CM

## 2024-10-22 DIAGNOSIS — K21.9 GASTROESOPHAGEAL REFLUX DISEASE WITHOUT ESOPHAGITIS: ICD-10-CM

## 2024-10-22 DIAGNOSIS — I10 BENIGN ESSENTIAL HYPERTENSION: ICD-10-CM

## 2024-10-22 DIAGNOSIS — Z23 ENCOUNTER FOR IMMUNIZATION: ICD-10-CM

## 2024-10-22 DIAGNOSIS — Z00.00 ANNUAL PHYSICAL EXAM: Primary | ICD-10-CM

## 2024-10-22 DIAGNOSIS — E78.2 MIXED HYPERLIPIDEMIA: ICD-10-CM

## 2024-10-22 DIAGNOSIS — J45.20 MILD INTERMITTENT ASTHMA WITHOUT COMPLICATION: ICD-10-CM

## 2024-10-22 PROCEDURE — 99214 OFFICE O/P EST MOD 30 MIN: CPT | Performed by: NURSE PRACTITIONER

## 2024-10-22 PROCEDURE — 99396 PREV VISIT EST AGE 40-64: CPT | Performed by: NURSE PRACTITIONER

## 2024-10-22 PROCEDURE — 90471 IMMUNIZATION ADMIN: CPT | Performed by: NURSE PRACTITIONER

## 2024-10-22 PROCEDURE — 90673 RIV3 VACCINE NO PRESERV IM: CPT | Performed by: NURSE PRACTITIONER

## 2024-10-22 RX ORDER — LISINOPRIL 10 MG/1
10 TABLET ORAL
Qty: 90 TABLET | Refills: 1 | Status: SHIPPED | OUTPATIENT
Start: 2024-10-22

## 2024-10-22 NOTE — PROGRESS NOTES
Adult Annual Physical  Name: Tasha Gill      : 1975      MRN: [id number]  Encounter Provider: ADRIENNE Linares  Encounter Date: 10/22/2024   Encounter department: [facility]    Assessment & Plan  Annual physical exam    Orders:    Comprehensive metabolic panel; Future    CBC and differential; Future    Lipid Panel with Direct LDL reflex; Future    TSH, 3rd generation with Free T4 reflex; Future    Benign essential hypertension  Stable.  On lisinopril 10 mg daily   Continue home blood pressure monitoring 1-2 times weekly     Orders:    lisinopril (ZESTRIL) 10 mg tablet; Take 1 tablet (10 mg total) by mouth daily at bedtime    Mild intermittent asthma without complication  Stable.  Takes singulair and claritin daily.   Albuterol as needed.        Gastroesophageal reflux disease without esophagitis  Takes omeprazole as needed- rare use.        Mixed hyperlipidemia  Check lipids.   Orders:    CT coronary calcium score; Future    S/P hysterectomy    Orders:    Ambulatory Referral to Obstetrics / Gynecology; Future    Encounter for immunization    Orders:    influenza vaccine, recombinant, PF, 0.5 mL IM (Flublok)    Immunizations and preventive care screenings were discussed with patient today. Appropriate education was printed on patient's after visit summary.    Counseling:  Exercise: the importance of regular exercise/physical activity was discussed. Recommend exercise 3-5 times per week for at least 30 minutes.          History of Present Illness     Adult Annual Physical:  Patient presents for annual physical. She recently got a new blood pressure machine. BP has been running 108-130/80's. She is only taking 10 mg of lisinopril.     She finishes school in 9 weeks. She plans to start working on her health and exercising again after she finishes. .     Diet and Physical Activity:  - Diet/Nutrition: poor diet.  - Exercise: no formal exercise.    Depression  "Screening:  - PHQ-2 Score: 0    General Health:  - Sleep: sleeps well.  - Hearing: normal hearing right ear and normal hearing left ear.  - Vision: goes for regular eye exams.  - Dental: regular dental visits.    /GYN Health:  - Follows with GYN: no.   - Contraception:. s/p hysterectomy      Review of Systems   Constitutional:  Negative for activity change, appetite change, fatigue and unexpected weight change.   Eyes:  Negative for visual disturbance.   Respiratory:  Negative for cough, chest tightness, shortness of breath and wheezing.    Cardiovascular:  Negative for chest pain, palpitations and leg swelling.   Gastrointestinal:  Negative for abdominal pain, constipation and diarrhea.   Genitourinary:  Negative for difficulty urinating.   Musculoskeletal:  Negative for arthralgias.   Neurological:  Negative for dizziness, light-headedness and headaches.   Psychiatric/Behavioral:  Negative for sleep disturbance.          Objective     /86 (BP Location: Right arm, Patient Position: Sitting, Cuff Size: Large)   Pulse 98   Temp 98 °F (36.7 °C) (Temporal)   Resp 16   Ht 5' 7.5\" (1.715 m)   Wt 105 kg (232 lb)   LMP 06/16/2016   SpO2 98%   BMI 35.80 kg/m²     Physical Exam  Vitals reviewed.   Constitutional:       Appearance: Normal appearance. She is well-developed.   HENT:      Head: Normocephalic and atraumatic.   Eyes:      Conjunctiva/sclera: Conjunctivae normal.   Neck:      Thyroid: No thyromegaly.   Cardiovascular:      Rate and Rhythm: Normal rate and regular rhythm.      Heart sounds: Normal heart sounds.   Pulmonary:      Effort: Pulmonary effort is normal.      Breath sounds: Normal breath sounds.   Abdominal:      General: Bowel sounds are normal.      Palpations: Abdomen is soft.   Musculoskeletal:         General: Normal range of motion.      Right lower leg: No edema.      Left lower leg: No edema.   Skin:     General: Skin is warm and dry.   Neurological:      Mental Status: She is alert " and oriented to person, place, and time.   Psychiatric:         Mood and Affect: Mood normal.         Behavior: Behavior normal.

## 2024-10-22 NOTE — ASSESSMENT & PLAN NOTE
Stable.  On lisinopril 10 mg daily   Continue home blood pressure monitoring 1-2 times weekly     Orders:    lisinopril (ZESTRIL) 10 mg tablet; Take 1 tablet (10 mg total) by mouth daily at bedtime

## 2024-10-24 PROCEDURE — 88305 TISSUE EXAM BY PATHOLOGIST: CPT | Performed by: PATHOLOGY

## 2024-11-01 NOTE — ADDENDUM NOTE
Addended byJacob Landa on: 5/24/2018 12:36 PM     Modules accepted: Orders
complains of pain/discomfort

## 2024-11-22 ENCOUNTER — OFFICE VISIT (OUTPATIENT)
Dept: OBGYN CLINIC | Facility: CLINIC | Age: 49
End: 2024-11-22
Payer: COMMERCIAL

## 2024-11-22 VITALS
SYSTOLIC BLOOD PRESSURE: 128 MMHG | HEIGHT: 68 IN | BODY MASS INDEX: 36.07 KG/M2 | DIASTOLIC BLOOD PRESSURE: 92 MMHG | WEIGHT: 238 LBS

## 2024-11-22 DIAGNOSIS — Z90.710 S/P HYSTERECTOMY: ICD-10-CM

## 2024-11-22 DIAGNOSIS — Z01.419 WELL WOMAN EXAM WITH ROUTINE GYNECOLOGICAL EXAM: Primary | ICD-10-CM

## 2024-11-22 PROCEDURE — S0610 ANNUAL GYNECOLOGICAL EXAMINA: HCPCS

## 2024-11-22 NOTE — PROGRESS NOTES
ASSESSMENT & PLAN:   Diagnoses and all orders for this visit:    Well woman exam with routine gynecological exam    S/P hysterectomy  -     Ambulatory Referral to Obstetrics / Gynecology      The following were reviewed in today's visit: ASCCP guidelines, Gardisil vaccination, STD testing breast self exam, mammography screening ordered, STD testing, menopause, exercise, and healthy diet.    Patient to return to office in yearly for annual exam.     All questions have been answered to her satisfaction.    CC:  Annual Gynecologic Examination  Chief Complaint   Patient presents with    Gynecologic Exam     Patient presents for a yearly exam.  Hysterectomy 2017  Mammo 2024 Normal Repeat 1 year  Colonoscopy 2024 Normal Repeat 10/20/2029     HPI: Tasha Gill is a 49 y.o.  who presents for annual gynecologic examination.  She has the following concerns:  none. She is a new patient to our office. She has not had GYN care since her hysterectomy. She is s/p laparoscopic assisted vaginal hysterectomy and BTL in 2017 2/2 excessive and frequent menstruation with irregular cycle and fibroids.    Health Maintenance:    Exercise: intermittently  Breast exams/breast awareness: yes  Last mammogram: 2024, BIRADS2  Colorectal cancer screening: colonoscopy 10/21/2024, repeat 5 yrs    Past Medical History:   Diagnosis Date    Anxiety     Asthma     Blood pressure elevated without history of HTN     Last Assessed:2012    COVID-19 2021    Depression     GERD (gastroesophageal reflux disease)     History of herpes simplex infection     History of hypertension     History of palpitations     Hyperlipidemia     IBS (irritable bowel syndrome)     Microscopic hematuria     Last Assessed:6/10/2014    Obesity     Palpitations 3/29/2022    Seasonal allergies     Superficial thrombophlebitis of leg     Vitamin D deficiency      Past Surgical History:   Procedure Laterality Date    AUGMENTATION MAMMAPLASTY  Bilateral     age 42    COLONOSCOPY      Last Assessed:7/9/2012    DENTAL SURGERY      HYSTERECTOMY      age 40    AZ BREAST AUGMENTATION WITH IMPLANT Bilateral 06/29/2016    Procedure: AUGMENTATION BREAST;  Surgeon: Trevor Vázquez MD;  Location:  MAIN OR;  Service: Plastics    AZ LAPS VAGINAL HYSTERECTOMY UTERUS 250 GM/< N/A 06/30/2017    Procedure: LAPAROSCOPIC ASSISTED VAGINAL HYSTERECTOMY AND BILATERAL REMOVAL OF FALOPIAN TUBES;  Surgeon: Moon Quinonez MD;  Location:  MAIN OR;  Service: Gynecology     Past OB/Gyn History:   Patient's last menstrual period was 06/16/2016.    Menopausal status: perimenopausal  Menopausal symptoms: None    Last Pap: prior to hysterectomy in 2017: no abnormalities  History of abnormal Pap smear: no    Patient is currently sexually active.   STD testing: no  Current contraception: status post hysterectomy    Family History  Family History   Problem Relation Age of Onset    Heart attack Father         50's    Colon cancer Father     Coronary artery disease Father     Diabetes Father     Cancer Father         Brain Cancer     No Known Problems Sister     Breast cancer Sister 37    Anxiety disorder Sister     No Known Problems Brother     No Known Problems Daughter     Rectal cancer Maternal Grandfather     No Known Problems Maternal Aunt     No Known Problems Maternal Aunt     Colon cancer Paternal Aunt     Alcohol abuse Neg Hx     Substance Abuse Neg Hx     Mental illness Neg Hx     Depression Neg Hx      Family history of uterine or ovarian cancer: no  Family history of breast cancer: yes, sister  Family history of colon cancer: yes, father, MGM, paternal aunt     Social History:  Social History     Socioeconomic History    Marital status: /Civil Union     Spouse name: Not on file    Number of children: Not on file    Years of education: Not on file    Highest education level: Not on file   Occupational History    Occupation: Currently working   Tobacco Use    Smoking  status: Never    Smokeless tobacco: Never   Vaping Use    Vaping status: Never Used   Substance and Sexual Activity    Alcohol use: Yes     Comment: social use of beer and wine    Drug use: No    Sexual activity: Yes     Partners: Male     Birth control/protection: None     Comment: Hysterectomy 2017   Other Topics Concern    Not on file   Social History Narrative    Marital maladjustment involving divorce    Currently working     Social Drivers of Health     Financial Resource Strain: Not on file   Food Insecurity: Not on file   Transportation Needs: Not on file   Physical Activity: Not on file   Stress: Not on file   Social Connections: Not on file   Intimate Partner Violence: Not on file   Housing Stability: Not on file     Domestic violence screen: negative    Allergies:  Allergies   Allergen Reactions    Erythromycin      Other reaction(s): GI Upset       Medications:    Current Outpatient Medications:     albuterol (PROVENTIL HFA,VENTOLIN HFA) 90 mcg/act inhaler, Inhale 2 puffs every 6 (six) hours as needed for wheezing., Disp: , Rfl:     Cholecalciferol (Vitamin D) 50 MCG (2000 UT) CAPS, every 24 hours, Disp: , Rfl:     lisinopril (ZESTRIL) 10 mg tablet, Take 1 tablet (10 mg total) by mouth daily at bedtime, Disp: 90 tablet, Rfl: 1    loratadine (CLARITIN) 10 mg tablet, Take 10 mg by mouth as needed, Disp: , Rfl:     montelukast (SINGULAIR) 10 mg tablet, Take 10 mg by mouth daily at bedtime. Pt not dure of dose, advised to bring list am of surgery, Disp: , Rfl:     valACYclovir (VALTREX) 1,000 mg tablet, Take 1 tablet (1,000 mg total) by mouth 2 (two) times a day as needed (cold sores) for up to 2 days, Disp: 20 tablet, Rfl: 0    Multiple Vitamin (MULTIVITAMIN) capsule, Take 1 capsule by mouth daily (Patient not taking: Reported on 10/22/2024), Disp: , Rfl:     Prevalite 4 GM/DOSE powder, TAKE 1 SCOOP BY MOUTH 2 TIMES A DAY WITH MEALS (Patient not taking: Reported on 11/22/2024), Disp: 718.2 g, Rfl:  "0    Review of Systems:  Review of Systems   Constitutional:  Negative for chills and fever.   Respiratory:  Negative for shortness of breath.    Cardiovascular:  Negative for chest pain.   Gastrointestinal:  Negative for abdominal pain, constipation and diarrhea.   Genitourinary:  Negative for dysuria, frequency, pelvic pain, vaginal discharge and vaginal pain.   Psychiatric/Behavioral:  Negative for self-injury and suicidal ideas.        Physical Exam:  /92 (BP Location: Right arm, Patient Position: Sitting, Cuff Size: Large)   Ht 5' 7.5\" (1.715 m)   Wt 108 kg (238 lb)   LMP 06/16/2016   Breastfeeding No   BMI 36.73 kg/m²    Physical Exam  Constitutional:       Appearance: Normal appearance.   Genitourinary:      Vulva and urethral meatus normal.      No labial fusion noted.      Vaginal cuff intact.     No vaginal erythema or tenderness.        Right Adnexa: not tender.     Left Adnexa: not tender.     Cervix is absent.      Uterus is absent.   Breasts:     Breasts are symmetrical.      Right: Normal.      Left: Normal.   HENT:      Head: Normocephalic and atraumatic.   Neck:      Thyroid: No thyroid mass or thyroid tenderness.   Cardiovascular:      Rate and Rhythm: Normal rate and regular rhythm.      Heart sounds: Normal heart sounds. No murmur heard.  Pulmonary:      Effort: Pulmonary effort is normal.      Breath sounds: Normal breath sounds. No wheezing.   Abdominal:      Palpations: Abdomen is soft. There is no mass.      Tenderness: There is no abdominal tenderness.   Lymphadenopathy:      Cervical: No cervical adenopathy.   Neurological:      General: No focal deficit present.      Mental Status: She is alert and oriented to person, place, and time.   Skin:     General: Skin is warm and dry.   Psychiatric:         Mood and Affect: Mood normal.         Behavior: Behavior normal.   Vitals and nursing note reviewed.                              "

## 2025-04-29 ENCOUNTER — OFFICE VISIT (OUTPATIENT)
Dept: INTERNAL MEDICINE CLINIC | Facility: CLINIC | Age: 50
End: 2025-04-29
Payer: COMMERCIAL

## 2025-04-29 VITALS
OXYGEN SATURATION: 97 % | DIASTOLIC BLOOD PRESSURE: 84 MMHG | HEIGHT: 68 IN | HEART RATE: 80 BPM | RESPIRATION RATE: 14 BRPM | SYSTOLIC BLOOD PRESSURE: 120 MMHG | WEIGHT: 236 LBS | BODY MASS INDEX: 35.77 KG/M2 | TEMPERATURE: 97.9 F

## 2025-04-29 DIAGNOSIS — K21.9 GASTROESOPHAGEAL REFLUX DISEASE WITHOUT ESOPHAGITIS: ICD-10-CM

## 2025-04-29 DIAGNOSIS — Z12.31 ENCOUNTER FOR SCREENING MAMMOGRAM FOR BREAST CANCER: ICD-10-CM

## 2025-04-29 DIAGNOSIS — K59.1 FUNCTIONAL DIARRHEA: ICD-10-CM

## 2025-04-29 DIAGNOSIS — I10 BENIGN ESSENTIAL HYPERTENSION: Primary | ICD-10-CM

## 2025-04-29 DIAGNOSIS — E78.2 MIXED HYPERLIPIDEMIA: ICD-10-CM

## 2025-04-29 DIAGNOSIS — J45.20 MILD INTERMITTENT ASTHMA WITHOUT COMPLICATION: ICD-10-CM

## 2025-04-29 PROCEDURE — 99214 OFFICE O/P EST MOD 30 MIN: CPT | Performed by: NURSE PRACTITIONER

## 2025-04-29 NOTE — PROGRESS NOTES
"Name: Tasha Gill      : 1975      MRN: [id number]  Encounter Provider: ADRIENNE Linares  Encounter Date: 2025   Encounter department: [facility] INTER[facility] MEDICINE  :  Assessment & Plan  Benign essential hypertension  Stable.  Continue lisinopril.          Mild intermittent asthma without complication  No recent symptoms.   On singulair and claritin.   Albuterol as needed.          Functional diarrhea  Takes cholestyramine.          Gastroesophageal reflux disease without esophagitis  Takes a PPI as needed.          Mixed hyperlipidemia  Overdue for labs.          Encounter for screening mammogram for breast cancer    Orders:  •  Mammo screening bilateral w 3d and cad; Future           History of Present Illness   Tasha is here today for follow up.  She is doing well.  She just finished school.     BP at home runs <120/80.   She has been going to the gym twice weekly.     Breathing has been stable. No recent albuterol use.       Review of Systems   Constitutional:  Negative for activity change, appetite change and fatigue.   Respiratory:  Negative for cough, chest tightness, shortness of breath and wheezing.    Cardiovascular:  Negative for chest pain, palpitations and leg swelling.   Gastrointestinal:  Negative for abdominal pain, constipation and diarrhea.   Genitourinary:  Negative for difficulty urinating.   Musculoskeletal:  Negative for arthralgias.   Neurological:  Negative for dizziness, light-headedness and headaches.   Psychiatric/Behavioral:  Negative for sleep disturbance.        Objective   /84 (BP Location: Left arm, Patient Position: Sitting, Cuff Size: Large)   Pulse 80   Temp 97.9 °F (36.6 °C)   Resp 14   Ht 5' 7.5\" (1.715 m)   Wt 107 kg (236 lb)   LMP 2016   SpO2 97%   BMI 36.42 kg/m²      Physical Exam  Vitals reviewed.   Constitutional:       Appearance: Normal appearance.   HENT:      Head: Normocephalic and atraumatic.   Eyes:      " Conjunctiva/sclera: Conjunctivae normal.   Cardiovascular:      Rate and Rhythm: Normal rate and regular rhythm.      Heart sounds: Normal heart sounds.   Pulmonary:      Effort: Pulmonary effort is normal.      Breath sounds: Normal breath sounds.   Skin:     General: Skin is warm and dry.   Neurological:      Mental Status: She is alert and oriented to person, place, and time.   Psychiatric:         Mood and Affect: Mood normal.         Behavior: Behavior normal.

## 2025-05-12 ENCOUNTER — OFFICE VISIT (OUTPATIENT)
Dept: OBGYN CLINIC | Facility: CLINIC | Age: 50
End: 2025-05-12
Payer: COMMERCIAL

## 2025-05-12 VITALS
DIASTOLIC BLOOD PRESSURE: 82 MMHG | WEIGHT: 237.4 LBS | SYSTOLIC BLOOD PRESSURE: 128 MMHG | BODY MASS INDEX: 35.98 KG/M2 | HEIGHT: 68 IN

## 2025-05-12 DIAGNOSIS — N89.8 VAGINAL DRYNESS: Primary | ICD-10-CM

## 2025-05-12 PROCEDURE — 99213 OFFICE O/P EST LOW 20 MIN: CPT | Performed by: OBSTETRICS & GYNECOLOGY

## 2025-05-12 RX ORDER — ESTRADIOL 0.1 MG/G
0.5 CREAM VAGINAL 3 TIMES WEEKLY
Qty: 42.5 G | Refills: 0 | Status: SHIPPED | OUTPATIENT
Start: 2025-05-12

## 2025-05-12 NOTE — PROGRESS NOTES
"Assessment/Plan:     There are no diagnoses linked to this encounter.      49-year-old female  -0-0-1  Vaginal dryness/dyspareunia  Intermittent night sweat  Weight gain  Had hysterectomy secondary to abnormal uterine bleeding  Plan  Refer to weight management  Vaginal estrogen with oil-based moisturizer  Magnesium at bedtime  Management option for night sweats reviewed and discussed with patient desire to hold off on medication for now  Return to office in 4 months for follow-up    Subjective:      Patient ID: Tasha Gill is a 49 y.o. female.    HPI  48 yo female  office today for menopausal symptoms  Patient main complaint of vaginal dryness and dyspareunia  Patient states symptoms started 2 months ago getting worse  Patient has the same partner  Patient started to have intermittent night sweats that comes and go  Not every night  Also has weight gain and difficulty losing weight      HAD HYSTERECTOMY sec to heavy bleeding 2017  PMH asthma and CHTN   PSH hysterectomy and breast implant  Denies any H/O DVT  FH colon cancer father and paternal aunt      The following portions of the patient's history were reviewed and updated as appropriate: allergies, current medications, past family history, past medical history, past social history, past surgical history and problem list.    Review of Systems      Objective:      /82 (BP Location: Left arm, Patient Position: Sitting, Cuff Size: Adult)   Ht 5' 7.5\" (1.715 m)   Wt 108 kg (237 lb 6.4 oz)   LMP 2016   BMI 36.63 kg/m²          Physical Exam  Constitutional:       Appearance: Normal appearance.   Neurological:      General: No focal deficit present.      Mental Status: She is alert and oriented to person, place, and time.   Psychiatric:         Mood and Affect: Mood normal.         Behavior: Behavior normal.           "

## 2025-05-12 NOTE — PROGRESS NOTES
Pre-charting for Appointment      Reason for being seen today: Consult for menopause     Any current testing/imaging done prior to exam today:     cough/shortness of breath

## 2025-05-29 DIAGNOSIS — I10 BENIGN ESSENTIAL HYPERTENSION: ICD-10-CM

## 2025-05-29 RX ORDER — LISINOPRIL 10 MG/1
10 TABLET ORAL
Qty: 30 TABLET | Refills: 0 | Status: SHIPPED | OUTPATIENT
Start: 2025-05-29 | End: 2025-05-30

## 2025-05-30 ENCOUNTER — PATIENT MESSAGE (OUTPATIENT)
Dept: INTERNAL MEDICINE CLINIC | Facility: CLINIC | Age: 50
End: 2025-05-30

## 2025-05-30 DIAGNOSIS — I10 BENIGN ESSENTIAL HYPERTENSION: ICD-10-CM

## 2025-05-30 RX ORDER — LISINOPRIL 10 MG/1
10 TABLET ORAL
Qty: 30 TABLET | Refills: 0 | Status: SHIPPED | OUTPATIENT
Start: 2025-05-30

## 2025-06-01 RX ORDER — LISINOPRIL 10 MG/1
10 TABLET ORAL
Qty: 90 TABLET | OUTPATIENT
Start: 2025-06-01

## 2025-06-02 DIAGNOSIS — I10 BENIGN ESSENTIAL HYPERTENSION: Primary | ICD-10-CM

## 2025-06-26 NOTE — PROGRESS NOTES
"Weight Management Medical Nutrition Assessment  Tasha presented for a meal planning session. Today's weight is 236.8# . Hx of IBS, Mixed HLD, Gerd,  and Benign essential Htn. States she possibly has gallbladder issues and takes cholestyramine. Denies any hx of IBS or foods that can cause GI upset apart from cabbage.   Per dietary recall patient consumes excess calories from  carbohydrates and an overall lack of fiber and protein. Educated using the plate method. Motivated to make healthy changes.Developed and reviewed a low calorie meal plan. Hydration not at goal will work on increasing. Goals established.  Will follow up.    Dislikes: Seafood  Goals:  1) Start tracking daily intake  2) Goal of 10,000 steps daily + 3 days at the gym     Patient seen by Medical Provider in past 6 months:  yes  Requested to schedule appointment with Medical Provider: No      Anthropometric Measurements  Start Weight (#) & Date: 236.8# 07/03/2025  Current Weight (#): 236.8#  TBW % Change from start weight:n/a  Ideal Body Weight (#):140# 68 inches  Goal Weight (#):175-180#  Highest: Current 236.8#  Lowest: 194#    Weight Loss History  Previous weight loss attempts: Self Created Diets (Portion Control, Healthy Food Choices, etc.)  V Shred meal plans    Food and Nutrition Related History  Wake up: 5:30 am   Bed Time:9-9:30 pm    Food Recall- \"not a snacker\"  Breakfast: 9am: Fruit + Nutritgrain bar + coffee ( 2 tbsp sugar + 1/2 tbsp half and half)    Snack:skip  Lunch:12:15 pm: Salad kit + 6 chicken nuggets   Snack:skip  Dinner:Mostly homemade: Protein or chicken breast + macaroni salad 1/2 to 3/4 cup ( tuna fish and peas)  Snack:skip or Dessert twice a week      Beverages: water 60 oz + 1 cup pineapple juice,Coffee   Volume of beverage intake: A least 64 oz a day    Weekends: Worse, More takeout  Cravings: sugar  Trouble area of day:at night     Frequency of Eating out: 3 days a week  Food restrictions:n/a  Cooking: self   Food " Shopping: self    Physical Activity Intake  Activity:Gym twice a week 20 mins cardio + lifting   Frequency:2 days a week  Physical limitations/barriers to exercise: n/a    Estimated Needs  Energy  SECA: BMR:n/a      X 1.3 -1000 =  Shoaib Willis Energy Needs: BMR : 1748   1-2# loss weekly sedentary:  3893-2857             1-2# loss weekly lightly active:2013-5455  Maintenance calories for sedentary activity level: 2097  Protein:76-95 grams      (1.2-1.5g/kg IBW)  Fluid Requirement Calculator   Total Fluid: 110   oz  (Raleigh-Segar Method)  Free Fluid: 88 oz (Radha-Segar Method - 20%)    Nutrition Diagnosis  Yes;    Overweight/obesity  related to Excess energy intake as evidenced by  BMI more than normative standard for age and sex (obesity-grade II 35-39.9)       Nutrition Intervention    Nutrition Prescription  Calories:1300 kcal  Protein:95 grams   Fluid:88 oz    Meal Plan (Barney/Pro/Carb)  Breakfast: 450 kcal/30-35 grams   Snack: skip  Lunch: 400 kcal/30-35 grams  Snack: skip  Dinner: 450 kcal/ 30-35 grams  Snack: skip    Nutrition Education:    Calorie controlled menu  Lean protein food choices  Healthy snack options  Food journaling tips      Nutrition Counseling:  Strategies: meal planning, portion sizes, healthy snack choices, hydration, fiber intake, protein intake, exercise, food journal      Monitoring and Evaluation:  Evaluation criteria:  Energy Intake  Meet protein needs  Maintain adequate hydration  Monitor weekly weight  Meal planning/preparation  Food journal   Decreased portions at mealtimes and snacks  Physical activity     Barriers to learning:none  Readiness to change: Action:  (Changing behavior)  Comprehension: excellent  Expected Compliance: very good

## 2025-06-27 DIAGNOSIS — I10 BENIGN ESSENTIAL HYPERTENSION: ICD-10-CM

## 2025-07-03 ENCOUNTER — CLINICAL SUPPORT (OUTPATIENT)
Age: 50
End: 2025-07-03

## 2025-07-03 VITALS — WEIGHT: 236.8 LBS | HEIGHT: 68 IN | BODY MASS INDEX: 35.89 KG/M2

## 2025-07-03 DIAGNOSIS — R63.5 ABNORMAL WEIGHT GAIN: Primary | ICD-10-CM

## 2025-07-03 PROCEDURE — RECHECK

## 2025-07-03 PROCEDURE — WMDI30

## 2025-07-07 RX ORDER — LISINOPRIL 10 MG/1
10 TABLET ORAL
Qty: 30 TABLET | Refills: 0 | Status: SHIPPED | OUTPATIENT
Start: 2025-07-07

## 2025-07-07 NOTE — TELEPHONE ENCOUNTER
Was she able to look into the cost of the BMP? It should be $20-50?   Again it is very important to check kidney function and electrolytes on this medication at least once yearly.

## 2025-07-08 ENCOUNTER — RESULTS FOLLOW-UP (OUTPATIENT)
Dept: INTERNAL MEDICINE CLINIC | Facility: CLINIC | Age: 50
End: 2025-07-08

## 2025-07-08 LAB
ALBUMIN SERPL-MCNC: 4.2 G/DL (ref 3.6–5.1)
ALBUMIN/GLOB SERPL: 1.6 (CALC) (ref 1–2.5)
ALP SERPL-CCNC: 55 U/L (ref 31–125)
ALT SERPL-CCNC: 26 U/L (ref 6–29)
AST SERPL-CCNC: 19 U/L (ref 10–35)
BASOPHILS # BLD AUTO: 48 CELLS/UL (ref 0–200)
BASOPHILS NFR BLD AUTO: 0.6 %
BILIRUB SERPL-MCNC: 0.5 MG/DL (ref 0.2–1.2)
BUN SERPL-MCNC: 10 MG/DL (ref 7–25)
BUN/CREAT SERPL: NORMAL (CALC) (ref 6–22)
CALCIUM SERPL-MCNC: 9.4 MG/DL (ref 8.6–10.2)
CHLORIDE SERPL-SCNC: 103 MMOL/L (ref 98–110)
CHOLEST SERPL-MCNC: 186 MG/DL
CHOLEST/HDLC SERPL: 3.2 (CALC)
CO2 SERPL-SCNC: 27 MMOL/L (ref 20–32)
CREAT SERPL-MCNC: 0.63 MG/DL (ref 0.5–0.99)
EOSINOPHIL # BLD AUTO: 112 CELLS/UL (ref 15–500)
EOSINOPHIL NFR BLD AUTO: 1.4 %
ERYTHROCYTE [DISTWIDTH] IN BLOOD BY AUTOMATED COUNT: 13 % (ref 11–15)
GFR/BSA.PRED SERPLBLD CYS-BASED-ARV: 109 ML/MIN/1.73M2
GLOBULIN SER CALC-MCNC: 2.7 G/DL (CALC) (ref 1.9–3.7)
GLUCOSE SERPL-MCNC: 99 MG/DL (ref 65–99)
HCT VFR BLD AUTO: 42.7 % (ref 35–45)
HDLC SERPL-MCNC: 59 MG/DL
HGB BLD-MCNC: 14 G/DL (ref 11.7–15.5)
LDLC SERPL CALC-MCNC: 97 MG/DL (CALC)
LYMPHOCYTES # BLD AUTO: 2656 CELLS/UL (ref 850–3900)
LYMPHOCYTES NFR BLD AUTO: 33.2 %
MCH RBC QN AUTO: 30 PG (ref 27–33)
MCHC RBC AUTO-ENTMCNC: 32.8 G/DL (ref 32–36)
MCV RBC AUTO: 91.4 FL (ref 80–100)
MONOCYTES # BLD AUTO: 472 CELLS/UL (ref 200–950)
MONOCYTES NFR BLD AUTO: 5.9 %
NEUTROPHILS # BLD AUTO: 4712 CELLS/UL (ref 1500–7800)
NEUTROPHILS NFR BLD AUTO: 58.9 %
NONHDLC SERPL-MCNC: 127 MG/DL (CALC)
PLATELET # BLD AUTO: 303 THOUSAND/UL (ref 140–400)
PMV BLD REES-ECKER: 9.8 FL (ref 7.5–12.5)
POTASSIUM SERPL-SCNC: 4.4 MMOL/L (ref 3.5–5.3)
PROT SERPL-MCNC: 6.9 G/DL (ref 6.1–8.1)
RBC # BLD AUTO: 4.67 MILLION/UL (ref 3.8–5.1)
SODIUM SERPL-SCNC: 138 MMOL/L (ref 135–146)
TRIGL SERPL-MCNC: 198 MG/DL
TSH SERPL-ACNC: 2.59 MIU/L
WBC # BLD AUTO: 8 THOUSAND/UL (ref 3.8–10.8)

## 2025-07-15 ENCOUNTER — NURSE TRIAGE (OUTPATIENT)
Age: 50
End: 2025-07-15

## 2025-07-15 ENCOUNTER — HOSPITAL ENCOUNTER (EMERGENCY)
Facility: HOSPITAL | Age: 50
Discharge: HOME | End: 2025-07-15
Attending: EMERGENCY MEDICINE | Admitting: EMERGENCY MEDICINE
Payer: COMMERCIAL

## 2025-07-15 VITALS
TEMPERATURE: 98.9 F | SYSTOLIC BLOOD PRESSURE: 165 MMHG | OXYGEN SATURATION: 97 % | RESPIRATION RATE: 20 BRPM | DIASTOLIC BLOOD PRESSURE: 82 MMHG | HEART RATE: 105 BPM

## 2025-07-15 DIAGNOSIS — R04.0 EPISTAXIS: Primary | ICD-10-CM

## 2025-07-15 PROCEDURE — 99283 EMERGENCY DEPT VISIT LOW MDM: CPT

## 2025-07-15 PROCEDURE — 99284 EMERGENCY DEPT VISIT MOD MDM: CPT | Performed by: EMERGENCY MEDICINE

## 2025-07-15 RX ORDER — OXYMETAZOLINE HYDROCHLORIDE 0.05 G/100ML
2 SPRAY NASAL ONCE
Status: COMPLETED | OUTPATIENT
Start: 2025-07-15 | End: 2025-07-15

## 2025-07-15 RX ORDER — TRANEXAMIC ACID 100 MG/ML
500 INJECTION, SOLUTION INTRAVENOUS ONCE
Status: COMPLETED | OUTPATIENT
Start: 2025-07-15 | End: 2025-07-15

## 2025-07-15 RX ADMIN — TRANEXAMIC ACID 500 MG: 100 INJECTION, SOLUTION INTRAVENOUS at 16:18

## 2025-07-15 RX ADMIN — OXYMETAZOLINE HYDROCHLORIDE 2 SPRAY: 0.05 SPRAY NASAL at 16:18

## 2025-07-15 NOTE — ED PROVIDER NOTES
Time reflects when diagnosis was documented in both MDM as applicable and the Disposition within this note       Time User Action Codes Description Comment    7/15/2025  5:38 PM Peggy Gilmore Wyatt [R04.0] Epistaxis           ED Disposition       ED Disposition   Discharge    Condition   Stable    Date/Time   Tue Jul 15, 2025  6:35 PM    Comment   Tasha Gill discharge to home/self care.                   Assessment & Plan       Medical Decision Making  Risk  OTC drugs.  Prescription drug management.      See ED course for MDM.    ED Course as of 07/16/25 0422   Tue Jul 15, 2025   1450 Differential diagnosis including but not limited to: anterior epistaxis, posterior epistaxis, sinus etiology,  bleeding diathesis, coagulopathy   1700 Afrin and txa applied with pressure applied.    1737 Reevaluated patient, no further bleeding.  Will continue to monitor and if no further bleeding, patient is stable for discharge with Afrin and ENT outpatient follow-up.   1918 Reevaluated patient, no further bleeding.  Afrin provided for patient to go home with.  Referral for ENT provided with along with instructions if she has another epistaxis.  Return precautions discussed.  Patient voiced understanding agrees with plan.  All questions and concerns addressed at this time.       Medications   oxymetazoline (AFRIN) 0.05 % nasal spray 2 spray (2 sprays Each Nare Given by Other 7/15/25 1618)   tranexamic acid 100mg/mL (TOPICAL/EPISTAXIS) 500 mg (500 mg Nasal Given by Other 7/15/25 1618)       ED Risk Strat Scores                    No data recorded                            History of Present Illness       Chief Complaint   Patient presents with    Nose Bleed     Pt reports multiple nose bleeds over last few days. No known injury to nose       Past Medical History[1]   Past Surgical History[2]   Family History[3]   Social History[4]   E-Cigarette/Vaping    E-Cigarette Use Never User       E-Cigarette/Vaping Substances     Nicotine No     THC No     CBD No     Flavoring No     Other No     Unknown No       I have reviewed and agree with the history as documented.     HPI  Patient is a 49-year-old female with no major past medical history presenting for epistaxis.  Patient reports she has had intermittent episodes of epistaxis, last episode of bleeding was 3 days ago.  Each of these episodes resolved spontaneously, however last episode lasted for several hours, approximately 6 hours.  Bleeding started around 2 PM today, patient presented for evaluation. Denies taking an ASA or AC. No trauma, recent surgical manipulation. Denies fevers, chills, URI symptoms, chest pain, SOB, nausea, abd pain or any other symptoms at this time.      Review of Systems  As per [facility]      Objective       ED Triage Vitals   Temperature Pulse Blood Pressure Respirations SpO2 Patient Position - Orthostatic VS   07/15/25 1530 07/15/25 1530 07/15/25 1530 07/15/25 1530 07/15/25 1530 07/15/25 1530   98.9 °F (37.2 °C) 105 165/82 20 97 % Sitting      Temp Source Heart Rate Source BP Location FiO2 (%) Pain Score    07/15/25 1530 07/15/25 1530 07/15/25 1530 -- 07/15/25 1540    Oral Monitor Right arm  2      Vitals      Date and Time Temp Pulse SpO2 Resp BP Pain Score FACES Pain Rating User   07/15/25 1540 -- -- -- -- -- 2 -- HH   07/15/25 1530 98.9 °F (37.2 °C) 105 97 % 20 165/82 -- -- RM            Physical Exam  Vitals and nursing note reviewed.   Constitutional:       Appearance: She is not toxic-appearing or diaphoretic.   HENT:      Head: Normocephalic and atraumatic.      Right Ear: External ear normal.      Left Ear: External ear normal.      Nose:      Right Nostril: No epistaxis or septal hematoma.      Left Nostril: Epistaxis present. No septal hematoma.      Mouth/Throat:      Mouth: Mucous membranes are moist.     Eyes:      General: No scleral icterus.     Extraocular Movements: Extraocular movements intact.      Conjunctiva/sclera: Conjunctivae normal.        Cardiovascular:      Rate and Rhythm: Normal rate and regular rhythm.      Pulses: Normal pulses.           Radial pulses are 2+ on the right side.        Dorsalis pedis pulses are 2+ on the right side and 2+ on the left side.      Heart sounds: Normal heart sounds, S1 normal and S2 normal. No murmur heard.  Pulmonary:      Effort: Pulmonary effort is normal. No respiratory distress.      Breath sounds: Normal breath sounds. No stridor. No wheezing.   Abdominal:      Palpations: Abdomen is soft.      Tenderness: There is no abdominal tenderness. There is no guarding or rebound.     Musculoskeletal:         General: Normal range of motion.      Cervical back: Normal range of motion.     Skin:     General: Skin is warm and dry.     Neurological:      General: No focal deficit present.      Mental Status: She is alert and oriented to person, place, and time.     Psychiatric:         Mood and Affect: Mood normal.         Results Reviewed       None            No orders to display       Procedures    ED Medication and Procedure Management   Prior to Admission Medications   Prescriptions Last Dose Informant Patient Reported? Taking?   Cholecalciferol (Vitamin D) 50 MCG (2000 UT) CAPS  Self Yes No   Sig: every 24 hours   Multiple Vitamin (MULTIVITAMIN) capsule  Self Yes No   Sig: Take 1 capsule by mouth daily   albuterol (PROVENTIL HFA,VENTOLIN HFA) 90 mcg/act inhaler  Self Yes No   Sig: Inhale 2 puffs every 6 (six) hours as needed for wheezing.   cholestyramine light (PREVALITE) 4 GM/DOSE powder   No No   Sig: TAKE 1 SCOOP BY MOUTH 2 TIMES A DAY WITH MEALS   estradiol (ESTRACE VAGINAL) 0.1 mg/g vaginal cream   No No   Sig: Insert 0.5 g into the vagina 3 (three) times a week   lisinopril (ZESTRIL) 10 mg tablet   No No   Sig: TAKE 1 TABLET(10 MG) BY MOUTH DAILY AT BEDTIME   loratadine (CLARITIN) 10 mg tablet  Self Yes No   Sig: Take 10 mg by mouth as needed   montelukast (SINGULAIR) 10 mg tablet  Self Yes No   Sig:  Take 10 mg by mouth daily at bedtime. Pt not dure of dose, advised to bring list am of surgery   valACYclovir (VALTREX) 1,000 mg tablet  Self No No   Sig: Take 1 tablet (1,000 mg total) by mouth 2 (two) times a day as needed (cold sores) for up to 2 days      Facility-Administered Medications: None     Discharge Medication List as of 7/15/2025  6:37 PM        CONTINUE these medications which have NOT CHANGED    Details   albuterol (PROVENTIL HFA,VENTOLIN HFA) 90 mcg/act inhaler Inhale 2 puffs every 6 (six) hours as needed for wheezing., Historical Med      Cholecalciferol (Vitamin D) 50 MCG (2000 UT) CAPS every 24 hours, Historical Med      cholestyramine light (PREVALITE) 4 GM/DOSE powder TAKE 1 SCOOP BY MOUTH 2 TIMES A DAY WITH MEALS, Normal      estradiol (ESTRACE VAGINAL) 0.1 mg/g vaginal cream Insert 0.5 g into the vagina 3 (three) times a week, Starting Mon 5/12/2025, Normal      lisinopril (ZESTRIL) 10 mg tablet TAKE 1 TABLET(10 MG) BY MOUTH DAILY AT BEDTIME, Starting Mon 7/7/2025, Normal      loratadine (CLARITIN) 10 mg tablet Take 10 mg by mouth as needed, Historical Med      montelukast (SINGULAIR) 10 mg tablet Take 10 mg by mouth daily at bedtime. Pt not dure of dose, advised to bring list am of surgery, Historical Med      Multiple Vitamin (MULTIVITAMIN) capsule Take 1 capsule by mouth daily, Historical Med      valACYclovir (VALTREX) 1,000 mg tablet Take 1 tablet (1,000 mg total) by mouth 2 (two) times a day as needed (cold sores) for up to 2 days, Starting Wed 12/30/2020, Until Fri 11/22/2024 at 2359, Normal             ED SEPSIS DOCUMENTATION   Time reflects when diagnosis was documented in both MDM as applicable and the Disposition within this note       Time User Action Codes Description Comment    7/15/2025  5:38 PM Peggy Gilmore Add [R04.0] Epistaxis                    [1]   Past Medical History:  Diagnosis Date    Anxiety     Asthma     Blood pressure elevated without history of HTN     Last  Assessed:6/30/2012    COVID-19 2/19/2021    Depression     GERD (gastroesophageal reflux disease)     History of herpes simplex infection     History of hypertension     History of palpitations     Hyperlipidemia     IBS (irritable bowel syndrome)     Microscopic hematuria     Last Assessed:6/10/2014    Obesity     Palpitations 3/29/2022    Seasonal allergies     Superficial thrombophlebitis of leg     Vitamin D deficiency    [2]   Past Surgical History:  Procedure Laterality Date    AUGMENTATION MAMMAPLASTY Bilateral     age 42    COLONOSCOPY      Last Assessed:7/9/2012    DENTAL SURGERY      HYSTERECTOMY      age 40    KS BREAST AUGMENTATION WITH IMPLANT Bilateral 06/29/2016    Procedure: AUGMENTATION BREAST;  Surgeon: Trevor Vázquez MD;  Location:  MAIN OR;  Service: Plastics    KS LAPS VAGINAL HYSTERECTOMY UTERUS 250 GM/< N/A 06/30/2017    Procedure: LAPAROSCOPIC ASSISTED VAGINAL HYSTERECTOMY AND BILATERAL REMOVAL OF FALOPIAN TUBES;  Surgeon: Moon Quinonez MD;  Location:  MAIN OR;  Service: Gynecology   [3]   Family History  Problem Relation Name Age of Onset    Heart attack Father          50's    Colon cancer Father      Coronary artery disease Father      Diabetes Father      Cancer Father          Brain Cancer     No Known Problems Sister      Breast cancer Sister Mellisa 37    Anxiety disorder Sister Mellisa     No Known Problems Brother      No Known Problems Daughter      Rectal cancer Maternal Grandfather      No Known Problems Maternal Aunt      No Known Problems Maternal Aunt      Colon cancer Paternal Aunt      Alcohol abuse Neg Hx      Substance Abuse Neg Hx      Mental illness Neg Hx      Depression Neg Hx     [4]   Social History  Tobacco Use    Smoking status: Never    Smokeless tobacco: Never   Vaping Use    Vaping status: Never Used   Substance Use Topics    Alcohol use: Yes     Comment: social use of beer and wine    Drug use: No        Peggy Gilmore MD  07/16/25 042

## 2025-07-31 DIAGNOSIS — I10 BENIGN ESSENTIAL HYPERTENSION: ICD-10-CM

## 2025-08-01 RX ORDER — LISINOPRIL 10 MG/1
10 TABLET ORAL
Qty: 30 TABLET | Refills: 5 | Status: SHIPPED | OUTPATIENT
Start: 2025-08-01

## (undated) DEVICE — NEEDLE SPINAL 22G X 7IN QUINCKE

## (undated) DEVICE — GLOVE SRG BIOGEL 7

## (undated) DEVICE — IRRIG ENDO FLO TUBING

## (undated) DEVICE — ADHESIVE SKN CLSR HISTOACRYL FLEX 0.5ML LF

## (undated) DEVICE — CHLORHEXIDINE 4PCT 4 OZ

## (undated) DEVICE — TRAY FOLEY 16FR URIMETER SURESTEP

## (undated) DEVICE — GLOVE INDICATOR PI UNDERGLOVE SZ 7 BLUE

## (undated) DEVICE — SYRINGE 20ML LL

## (undated) DEVICE — UNIVERSAL GYN LAPAROSCOPY,KIT: Brand: CARDINAL HEALTH

## (undated) DEVICE — VCARE MEDIUM UTERINE MANIPULATOR: Brand: VCARE MEDIUM

## (undated) DEVICE — LIGASURE IMPACT OPEN

## (undated) DEVICE — 1840 FOAM BLOCK NEEDLE COUNTER: Brand: DEVON

## (undated) DEVICE — BUTTON SWITCH PENCIL HOLSTER: Brand: VALLEYLAB

## (undated) DEVICE — AEM CORD

## (undated) DEVICE — MAYO STAND COVER: Brand: CONVERTORS

## (undated) DEVICE — Device

## (undated) DEVICE — NEEDLE SPINAL 22G X 3.5IN  QUINCKE

## (undated) DEVICE — SUT VICRYL 1 CT-1 CR/8 27 IN JJ40G

## (undated) DEVICE — 3000CC GUARDIAN II: Brand: GUARDIAN

## (undated) DEVICE — ENDOPATH XCEL UNIVERSAL TROCAR STABLILITY SLEEVES: Brand: ENDOPATH XCEL

## (undated) DEVICE — ROSEBUD DISSECTORS: Brand: DEROYAL

## (undated) DEVICE — ENDOPATH XCEL BLADELESS TROCARS WITH STABILITY SLEEVES: Brand: ENDOPATH XCEL

## (undated) DEVICE — MEDI-VAC YANK SUCT HNDL W/TPRD BULBOUS TIP: Brand: CARDINAL HEALTH

## (undated) DEVICE — BLUNT TIP LAPAROSCOPIC SEALER/DIVIDER: Brand: LIGASURE

## (undated) DEVICE — CHLORAPREP HI-LITE 26ML ORANGE